# Patient Record
Sex: FEMALE | Race: WHITE | Employment: OTHER | URBAN - METROPOLITAN AREA
[De-identification: names, ages, dates, MRNs, and addresses within clinical notes are randomized per-mention and may not be internally consistent; named-entity substitution may affect disease eponyms.]

---

## 2017-12-11 ENCOUNTER — TELEPHONE (OUTPATIENT)
Dept: INTERNAL MEDICINE | Age: 82
End: 2017-12-11

## 2017-12-11 NOTE — TELEPHONE ENCOUNTER
Patient's son Noa Whyte is asking for cintia to call him because his mother is having swelling in both legs I explain to him that cintia may not be able to give any information since patient hasn't been seen in office   Lazarus Thomas still want's cintia to call him

## 2017-12-12 NOTE — TELEPHONE ENCOUNTER
Call returned to [place]. [facility] is wanting to do physical therapy because the patient's legs are very swollen, which they seem to be all the time. Do you need to see the patient first , I could move her appointment to next Tuesday , or can you just give an order without seeing her.

## 2017-12-13 ENCOUNTER — TELEPHONE (OUTPATIENT)
Dept: INTERNAL MEDICINE | Age: 82
End: 2017-12-13

## 2017-12-13 DIAGNOSIS — R29.898 MUSCULAR DECONDITIONING: Primary | ICD-10-CM

## 2017-12-13 DIAGNOSIS — M40.00 POSTURAL KYPHOSIS, UNSPECIFIED SPINAL REGION: ICD-10-CM

## 2017-12-19 ENCOUNTER — OFFICE VISIT (OUTPATIENT)
Dept: INTERNAL MEDICINE | Age: 82
End: 2017-12-19

## 2017-12-19 VITALS
BODY MASS INDEX: 20.81 KG/M2 | HEIGHT: 60 IN | SYSTOLIC BLOOD PRESSURE: 130 MMHG | WEIGHT: 106 LBS | DIASTOLIC BLOOD PRESSURE: 82 MMHG

## 2017-12-19 DIAGNOSIS — L08.9 INFECTED ABRASION OF RIGHT LOWER EXTREMITY, INITIAL ENCOUNTER: Primary | ICD-10-CM

## 2017-12-19 DIAGNOSIS — M81.8 OTHER OSTEOPOROSIS WITHOUT CURRENT PATHOLOGICAL FRACTURE: ICD-10-CM

## 2017-12-19 DIAGNOSIS — R60.0 LOCALIZED EDEMA: ICD-10-CM

## 2017-12-19 DIAGNOSIS — S80.811A INFECTED ABRASION OF RIGHT LOWER EXTREMITY, INITIAL ENCOUNTER: Primary | ICD-10-CM

## 2017-12-19 DIAGNOSIS — Z85.118 H/O: LUNG CANCER: ICD-10-CM

## 2017-12-19 DIAGNOSIS — R29.6 RECURRENT FALLS WHILE WALKING: ICD-10-CM

## 2017-12-19 DIAGNOSIS — J43.8 OTHER EMPHYSEMA (HCC): ICD-10-CM

## 2017-12-19 PROCEDURE — G8926 SPIRO NO PERF OR DOC: HCPCS | Performed by: INTERNAL MEDICINE

## 2017-12-19 PROCEDURE — 1090F PRES/ABSN URINE INCON ASSESS: CPT | Performed by: INTERNAL MEDICINE

## 2017-12-19 PROCEDURE — 4005F PHARM THX FOR OP RXD: CPT | Performed by: INTERNAL MEDICINE

## 2017-12-19 PROCEDURE — 1123F ACP DISCUSS/DSCN MKR DOCD: CPT | Performed by: INTERNAL MEDICINE

## 2017-12-19 PROCEDURE — 1036F TOBACCO NON-USER: CPT | Performed by: INTERNAL MEDICINE

## 2017-12-19 PROCEDURE — 99203 OFFICE O/P NEW LOW 30 MIN: CPT | Performed by: INTERNAL MEDICINE

## 2017-12-19 PROCEDURE — 3023F SPIROM DOC REV: CPT | Performed by: INTERNAL MEDICINE

## 2017-12-19 PROCEDURE — 4040F PNEUMOC VAC/ADMIN/RCVD: CPT | Performed by: INTERNAL MEDICINE

## 2017-12-19 PROCEDURE — G8420 CALC BMI NORM PARAMETERS: HCPCS | Performed by: INTERNAL MEDICINE

## 2017-12-19 PROCEDURE — G8427 DOCREV CUR MEDS BY ELIG CLIN: HCPCS | Performed by: INTERNAL MEDICINE

## 2017-12-19 PROCEDURE — G8484 FLU IMMUNIZE NO ADMIN: HCPCS | Performed by: INTERNAL MEDICINE

## 2017-12-19 RX ORDER — VITAMIN B COMPLEX
1 CAPSULE ORAL DAILY
COMMUNITY
End: 2018-01-01

## 2017-12-19 RX ORDER — ALBUTEROL SULFATE 0.63 MG/3ML
1 SOLUTION RESPIRATORY (INHALATION) EVERY 6 HOURS PRN
COMMUNITY
End: 2018-02-19 | Stop reason: CLARIF

## 2017-12-27 ENCOUNTER — OFFICE VISIT (OUTPATIENT)
Dept: INTERNAL MEDICINE | Age: 82
End: 2017-12-27

## 2017-12-27 VITALS
DIASTOLIC BLOOD PRESSURE: 80 MMHG | SYSTOLIC BLOOD PRESSURE: 128 MMHG | BODY MASS INDEX: 20.81 KG/M2 | WEIGHT: 106 LBS | HEIGHT: 60 IN

## 2017-12-27 DIAGNOSIS — S80.811D ABRASION OF RIGHT LOWER EXTREMITY, SUBSEQUENT ENCOUNTER: Primary | ICD-10-CM

## 2017-12-27 PROCEDURE — 1036F TOBACCO NON-USER: CPT | Performed by: INTERNAL MEDICINE

## 2017-12-27 PROCEDURE — G8427 DOCREV CUR MEDS BY ELIG CLIN: HCPCS | Performed by: INTERNAL MEDICINE

## 2017-12-27 PROCEDURE — G8484 FLU IMMUNIZE NO ADMIN: HCPCS | Performed by: INTERNAL MEDICINE

## 2017-12-27 PROCEDURE — 1123F ACP DISCUSS/DSCN MKR DOCD: CPT | Performed by: INTERNAL MEDICINE

## 2017-12-27 PROCEDURE — 1090F PRES/ABSN URINE INCON ASSESS: CPT | Performed by: INTERNAL MEDICINE

## 2017-12-27 PROCEDURE — 4040F PNEUMOC VAC/ADMIN/RCVD: CPT | Performed by: INTERNAL MEDICINE

## 2017-12-27 PROCEDURE — G8420 CALC BMI NORM PARAMETERS: HCPCS | Performed by: INTERNAL MEDICINE

## 2017-12-27 PROCEDURE — 99213 OFFICE O/P EST LOW 20 MIN: CPT | Performed by: INTERNAL MEDICINE

## 2017-12-27 NOTE — PROGRESS NOTES
HPI: Patient presented with aloe up of her leg lesion is much better now she is getting a wrapped once a day    Review of Systems: Otherwise negative    Vital Signs: /80   Ht 5' (1.524 m)   Wt 106 lb (48.1 kg)   BMI 20.70 kg/m²   General: Patient appears  non-toxic  HENT: Atraumatic, normocephalic, oral mucosa moist  Lungs:  Clear bilaterally  Heart: Regular rate and rhythm  Abdomen: Non-distended, soft, non-tender  Extremities: Notable edema and erythema and no significant exudate or infection  Neuro: Nonfocal    Medical Decision Making and Plan:  Pertinent Labs & Imaging studies reviewed. (See chart for details)      There are no diagnoses linked to this encounter.

## 2018-01-01 ENCOUNTER — TELEPHONE (OUTPATIENT)
Dept: PULMONOLOGY | Age: 83
End: 2018-01-01

## 2018-01-01 ENCOUNTER — HOSPITAL ENCOUNTER (OUTPATIENT)
Dept: VASCULAR LAB | Age: 83
Discharge: HOME | End: 2018-11-20
Payer: MEDICARE

## 2018-01-01 ENCOUNTER — APPOINTMENT (OUTPATIENT)
Dept: CT IMAGING | Age: 83
End: 2018-01-01
Payer: MEDICARE

## 2018-01-01 ENCOUNTER — TELEPHONE (OUTPATIENT)
Dept: INTERNAL MEDICINE CLINIC | Age: 83
End: 2018-01-01

## 2018-01-01 ENCOUNTER — TELEPHONE (OUTPATIENT)
Dept: INTERNAL MEDICINE | Age: 83
End: 2018-01-01

## 2018-01-01 ENCOUNTER — HOSPITAL ENCOUNTER (EMERGENCY)
Age: 83
Discharge: HOME | End: 2018-12-26
Attending: EMERGENCY MEDICINE
Payer: MEDICARE

## 2018-01-01 ENCOUNTER — APPOINTMENT (OUTPATIENT)
Dept: MRI IMAGING | Age: 83
End: 2018-01-01
Payer: MEDICARE

## 2018-01-01 ENCOUNTER — OFFICE VISIT (OUTPATIENT)
Dept: PULMONOLOGY | Age: 83
End: 2018-01-01
Payer: MEDICARE

## 2018-01-01 ENCOUNTER — OFFICE VISIT (OUTPATIENT)
Dept: INTERNAL MEDICINE CLINIC | Age: 83
End: 2018-01-01
Payer: MEDICARE

## 2018-01-01 ENCOUNTER — TELEPHONE (OUTPATIENT)
Dept: DERMATOLOGY | Age: 83
End: 2018-01-01

## 2018-01-01 VITALS
WEIGHT: 105 LBS | RESPIRATION RATE: 16 BRPM | OXYGEN SATURATION: 94 % | SYSTOLIC BLOOD PRESSURE: 122 MMHG | HEIGHT: 60 IN | HEART RATE: 105 BPM | BODY MASS INDEX: 20.62 KG/M2 | DIASTOLIC BLOOD PRESSURE: 68 MMHG

## 2018-01-01 VITALS
DIASTOLIC BLOOD PRESSURE: 82 MMHG | SYSTOLIC BLOOD PRESSURE: 162 MMHG | BODY MASS INDEX: 20.2 KG/M2 | OXYGEN SATURATION: 98 % | WEIGHT: 107 LBS | HEIGHT: 61 IN | HEART RATE: 77 BPM

## 2018-01-01 VITALS
RESPIRATION RATE: 12 BRPM | OXYGEN SATURATION: 94 % | HEIGHT: 60 IN | WEIGHT: 103.2 LBS | BODY MASS INDEX: 20.26 KG/M2 | HEART RATE: 101 BPM | TEMPERATURE: 97.8 F | SYSTOLIC BLOOD PRESSURE: 176 MMHG | DIASTOLIC BLOOD PRESSURE: 89 MMHG

## 2018-01-01 DIAGNOSIS — M79.89 LEFT LEG SWELLING: ICD-10-CM

## 2018-01-01 DIAGNOSIS — R52 GENERALIZED PAIN: ICD-10-CM

## 2018-01-01 DIAGNOSIS — L03.116 CELLULITIS OF LEFT LEG: Primary | ICD-10-CM

## 2018-01-01 DIAGNOSIS — R06.09 DOE (DYSPNEA ON EXERTION): Primary | ICD-10-CM

## 2018-01-01 DIAGNOSIS — Z85.118 HISTORY OF LUNG CANCER: ICD-10-CM

## 2018-01-01 DIAGNOSIS — Z90.2 S/P LOBECTOMY OF LUNG: ICD-10-CM

## 2018-01-01 DIAGNOSIS — J44.9 CHRONIC OBSTRUCTIVE PULMONARY DISEASE, UNSPECIFIED COPD TYPE (HCC): Primary | ICD-10-CM

## 2018-01-01 DIAGNOSIS — M54.2 NECK PAIN: Primary | ICD-10-CM

## 2018-01-01 DIAGNOSIS — R06.02 SHORTNESS OF BREATH: ICD-10-CM

## 2018-01-01 DIAGNOSIS — G47.00 INSOMNIA, UNSPECIFIED TYPE: Primary | ICD-10-CM

## 2018-01-01 DIAGNOSIS — S09.90XA MINOR HEAD INJURY, INITIAL ENCOUNTER: ICD-10-CM

## 2018-01-01 DIAGNOSIS — M40.03 POSTURAL KYPHOSIS OF CERVICOTHORACIC REGION: ICD-10-CM

## 2018-01-01 DIAGNOSIS — M79.89 LEFT LEG SWELLING: Primary | ICD-10-CM

## 2018-01-01 DIAGNOSIS — J47.1 BRONCHIECTASIS WITH ACUTE EXACERBATION (HCC): Primary | ICD-10-CM

## 2018-01-01 LAB
AMORPHOUS: ABNORMAL /HPF
BACTERIA: ABNORMAL /HPF
BILIRUBIN URINE: NEGATIVE MG/DL
BLOOD, URINE: NEGATIVE
CASTS: ABNORMAL /LPF
CLARITY: ABNORMAL
COLOR: ABNORMAL
GLUCOSE URINE: NEGATIVE MG/DL
KETONES, URINE: NEGATIVE MG/DL
LEUKOCYTE ESTERASE, URINE: NEGATIVE
MICROSCOPIC EXAMINATION: YES
NITRITE, URINE: NEGATIVE
PH UA: 7.5
PROTEIN UA: 30 MG/DL
RBC UA: ABNORMAL /HPF (ref 0–2)
SPECIFIC GRAVITY UA: 1.02
UROBILINOGEN, URINE: 0.2 E.U./DL
WBC UA: ABNORMAL /HPF (ref 0–5)

## 2018-01-01 PROCEDURE — G8420 CALC BMI NORM PARAMETERS: HCPCS | Performed by: INTERNAL MEDICINE

## 2018-01-01 PROCEDURE — 99213 OFFICE O/P EST LOW 20 MIN: CPT | Performed by: INTERNAL MEDICINE

## 2018-01-01 PROCEDURE — 70450 CT HEAD/BRAIN W/O DYE: CPT

## 2018-01-01 PROCEDURE — 1123F ACP DISCUSS/DSCN MKR DOCD: CPT | Performed by: INTERNAL MEDICINE

## 2018-01-01 PROCEDURE — 72141 MRI NECK SPINE W/O DYE: CPT

## 2018-01-01 PROCEDURE — 1090F PRES/ABSN URINE INCON ASSESS: CPT | Performed by: INTERNAL MEDICINE

## 2018-01-01 PROCEDURE — 81001 URINALYSIS AUTO W/SCOPE: CPT

## 2018-01-01 PROCEDURE — 72125 CT NECK SPINE W/O DYE: CPT

## 2018-01-01 PROCEDURE — G8482 FLU IMMUNIZE ORDER/ADMIN: HCPCS | Performed by: INTERNAL MEDICINE

## 2018-01-01 PROCEDURE — 99284 EMERGENCY DEPT VISIT MOD MDM: CPT

## 2018-01-01 PROCEDURE — 1101F PT FALLS ASSESS-DOCD LE1/YR: CPT | Performed by: INTERNAL MEDICINE

## 2018-01-01 PROCEDURE — 4040F PNEUMOC VAC/ADMIN/RCVD: CPT | Performed by: INTERNAL MEDICINE

## 2018-01-01 PROCEDURE — 93971 EXTREMITY STUDY: CPT

## 2018-01-01 PROCEDURE — 99214 OFFICE O/P EST MOD 30 MIN: CPT | Performed by: INTERNAL MEDICINE

## 2018-01-01 PROCEDURE — G8427 DOCREV CUR MEDS BY ELIG CLIN: HCPCS | Performed by: INTERNAL MEDICINE

## 2018-01-01 PROCEDURE — 1036F TOBACCO NON-USER: CPT | Performed by: INTERNAL MEDICINE

## 2018-01-01 RX ORDER — ALPRAZOLAM 0.5 MG/1
0.5 TABLET ORAL NIGHTLY PRN
Qty: 30 TABLET | Refills: 0 | OUTPATIENT
Start: 2018-01-01 | End: 2018-01-01

## 2018-01-01 RX ORDER — ACETAMINOPHEN 500 MG
500 TABLET ORAL 4 TIMES DAILY PRN
Qty: 360 TABLET | Refills: 1 | Status: SHIPPED | OUTPATIENT
Start: 2018-01-01 | End: 2019-01-01 | Stop reason: CLARIF

## 2018-01-01 RX ORDER — DOXYCYCLINE HYCLATE 100 MG
100 TABLET ORAL 2 TIMES DAILY
Qty: 20 TABLET | Refills: 0 | Status: SHIPPED | OUTPATIENT
Start: 2018-01-01 | End: 2018-01-01 | Stop reason: SDUPTHER

## 2018-01-01 RX ORDER — DOXYCYCLINE HYCLATE 100 MG
100 TABLET ORAL 2 TIMES DAILY
Qty: 14 TABLET | Refills: 0 | Status: SHIPPED | OUTPATIENT
Start: 2018-01-01 | End: 2018-01-01

## 2018-01-01 RX ORDER — CEPHALEXIN 500 MG/1
500 CAPSULE ORAL 4 TIMES DAILY
Qty: 28 CAPSULE | Refills: 0 | Status: SHIPPED | OUTPATIENT
Start: 2018-01-01 | End: 2018-01-01

## 2018-01-01 RX ORDER — GUAIFENESIN 600 MG/1
600 TABLET, EXTENDED RELEASE ORAL 2 TIMES DAILY
Qty: 30 TABLET | Refills: 0 | Status: SHIPPED | OUTPATIENT
Start: 2018-01-01 | End: 2019-01-01 | Stop reason: CLARIF

## 2018-01-01 ASSESSMENT — PAIN DESCRIPTION - LOCATION: LOCATION: NECK

## 2018-01-01 ASSESSMENT — PATIENT HEALTH QUESTIONNAIRE - PHQ9
SUM OF ALL RESPONSES TO PHQ9 QUESTIONS 1 & 2: 0
1. LITTLE INTEREST OR PLEASURE IN DOING THINGS: 0
SUM OF ALL RESPONSES TO PHQ QUESTIONS 1-9: 0
SUM OF ALL RESPONSES TO PHQ QUESTIONS 1-9: 0
2. FEELING DOWN, DEPRESSED OR HOPELESS: 0

## 2018-01-01 ASSESSMENT — PAIN DESCRIPTION - DESCRIPTORS: DESCRIPTORS: CONSTANT;ACHING

## 2018-01-01 ASSESSMENT — PAIN DESCRIPTION - PAIN TYPE: TYPE: ACUTE PAIN

## 2018-01-01 ASSESSMENT — PAIN DESCRIPTION - FREQUENCY: FREQUENCY: CONTINUOUS

## 2018-01-01 ASSESSMENT — PAIN DESCRIPTION - ORIENTATION: ORIENTATION: MID

## 2018-01-01 ASSESSMENT — ENCOUNTER SYMPTOMS
RESPIRATORY NEGATIVE: 1
GASTROINTESTINAL NEGATIVE: 1
ABDOMINAL PAIN: 0
BACK PAIN: 0

## 2018-01-01 ASSESSMENT — PAIN SCALES - GENERAL: PAINLEVEL_OUTOF10: 6

## 2018-01-15 ENCOUNTER — TELEPHONE (OUTPATIENT)
Dept: INTERNAL MEDICINE | Age: 83
End: 2018-01-15

## 2018-01-15 NOTE — TELEPHONE ENCOUNTER
Alireza Min from [facility] care stated that pt  has  elevated heart rate at rest 111.   Please advise

## 2018-01-24 ENCOUNTER — TELEPHONE (OUTPATIENT)
Dept: INTERNAL MEDICINE | Age: 83
End: 2018-01-24

## 2018-01-24 DIAGNOSIS — R19.7 DIARRHEA, UNSPECIFIED TYPE: Primary | ICD-10-CM

## 2018-01-24 NOTE — TELEPHONE ENCOUNTER
Monica Diehl Nurse calling from [facility] and can be reached at [phone]  Diarrhea    How long? Yes x 2 days   Nausea? No  Blood in stool? No  Fever?   No   Tried OTC 2 doses of Imodium AD pt feels a little   Pharmacy:listed     Request to be Tx via phone   Aware that Cate Palma MD arrives around 1:20 pm today meanwhile message will be sent

## 2018-01-26 ENCOUNTER — TELEPHONE (OUTPATIENT)
Dept: INTERNAL MEDICINE | Age: 83
End: 2018-01-26

## 2018-01-26 NOTE — TELEPHONE ENCOUNTER
Radha Gonzales From [facility] stated that pt stool is formed. Does pt still needs stool sample for C-Diff?   Please advise

## 2018-01-26 NOTE — TELEPHONE ENCOUNTER
No , if stool is formed we do not need to run cdiff test.    Call returned and spoke to [place].

## 2018-01-26 NOTE — TELEPHONE ENCOUNTER
[facility] called to let Dr. Eusebio Moore know that they did not meet their frequency this week since the patient was sick. They saw the doctor 1 time instead of 2. Please call with questions.

## 2018-02-02 ENCOUNTER — TELEPHONE (OUTPATIENT)
Dept: INTERNAL MEDICINE | Age: 83
End: 2018-02-02

## 2018-02-09 ENCOUNTER — TELEPHONE (OUTPATIENT)
Dept: INTERNAL MEDICINE | Age: 83
End: 2018-02-09

## 2018-02-19 PROBLEM — R06.02 SHORTNESS OF BREATH: Status: ACTIVE | Noted: 2018-02-19

## 2018-02-21 ENCOUNTER — TELEPHONE (OUTPATIENT)
Dept: INFECTIOUS DISEASES | Age: 83
End: 2018-02-21

## 2018-02-21 PROBLEM — J47.1 BRONCHIECTASIS WITH ACUTE EXACERBATION (HCC): Status: ACTIVE | Noted: 2018-02-21

## 2018-02-21 PROBLEM — J96.01 ACUTE RESPIRATORY FAILURE WITH HYPOXIA (HCC): Status: ACTIVE | Noted: 2018-02-21

## 2018-02-21 PROBLEM — I16.0 HYPERTENSIVE URGENCY: Status: ACTIVE | Noted: 2018-02-21

## 2018-02-21 PROBLEM — B33.8 RSV (RESPIRATORY SYNCYTIAL VIRUS INFECTION): Status: ACTIVE | Noted: 2018-02-21

## 2018-02-22 ENCOUNTER — TELEPHONE (OUTPATIENT)
Dept: INTERNAL MEDICINE | Age: 83
End: 2018-02-22

## 2018-02-26 ENCOUNTER — TELEPHONE (OUTPATIENT)
Dept: INTERNAL MEDICINE | Age: 83
End: 2018-02-26

## 2018-02-26 NOTE — TELEPHONE ENCOUNTER
Call returned to Junior Buckley at number listed [phone] to get more information. The mailbox is full and unable to leave messages. Will wait and call back at a later time.

## 2018-03-20 ENCOUNTER — TELEPHONE (OUTPATIENT)
Dept: INTERNAL MEDICINE | Age: 83
End: 2018-03-20

## 2018-04-09 ENCOUNTER — OFFICE VISIT (OUTPATIENT)
Dept: INTERNAL MEDICINE | Age: 83
End: 2018-04-09

## 2018-04-09 VITALS
BODY MASS INDEX: 19.45 KG/M2 | HEART RATE: 91 BPM | OXYGEN SATURATION: 95 % | WEIGHT: 103 LBS | HEIGHT: 61 IN | DIASTOLIC BLOOD PRESSURE: 74 MMHG | SYSTOLIC BLOOD PRESSURE: 120 MMHG

## 2018-04-09 DIAGNOSIS — I10 ESSENTIAL HYPERTENSION: ICD-10-CM

## 2018-04-09 DIAGNOSIS — R60.0 BILATERAL LOWER EXTREMITY EDEMA: ICD-10-CM

## 2018-04-09 DIAGNOSIS — J43.8 OTHER EMPHYSEMA (HCC): Primary | ICD-10-CM

## 2018-04-09 PROCEDURE — 1123F ACP DISCUSS/DSCN MKR DOCD: CPT | Performed by: HOSPITALIST

## 2018-04-09 PROCEDURE — 4040F PNEUMOC VAC/ADMIN/RCVD: CPT | Performed by: HOSPITALIST

## 2018-04-09 PROCEDURE — 3023F SPIROM DOC REV: CPT | Performed by: HOSPITALIST

## 2018-04-09 PROCEDURE — G8427 DOCREV CUR MEDS BY ELIG CLIN: HCPCS | Performed by: HOSPITALIST

## 2018-04-09 PROCEDURE — G8926 SPIRO NO PERF OR DOC: HCPCS | Performed by: HOSPITALIST

## 2018-04-09 PROCEDURE — 1090F PRES/ABSN URINE INCON ASSESS: CPT | Performed by: HOSPITALIST

## 2018-04-09 PROCEDURE — 1036F TOBACCO NON-USER: CPT | Performed by: HOSPITALIST

## 2018-04-09 PROCEDURE — 99214 OFFICE O/P EST MOD 30 MIN: CPT | Performed by: HOSPITALIST

## 2018-04-09 PROCEDURE — G8420 CALC BMI NORM PARAMETERS: HCPCS | Performed by: HOSPITALIST

## 2018-04-09 ASSESSMENT — PATIENT HEALTH QUESTIONNAIRE - PHQ9
2. FEELING DOWN, DEPRESSED OR HOPELESS: 0
SUM OF ALL RESPONSES TO PHQ9 QUESTIONS 1 & 2: 0
SUM OF ALL RESPONSES TO PHQ QUESTIONS 1-9: 0
1. LITTLE INTEREST OR PLEASURE IN DOING THINGS: 0

## 2018-04-09 ASSESSMENT — ENCOUNTER SYMPTOMS
EYES NEGATIVE: 1
COUGH: 1
GASTROINTESTINAL NEGATIVE: 1

## 2018-04-23 ENCOUNTER — TELEPHONE (OUTPATIENT)
Dept: INTERNAL MEDICINE | Age: 83
End: 2018-04-23

## 2018-05-04 ENCOUNTER — TELEPHONE (OUTPATIENT)
Dept: INTERNAL MEDICINE | Age: 83
End: 2018-05-04

## 2018-05-04 DIAGNOSIS — Z85.118 H/O: LUNG CANCER: ICD-10-CM

## 2018-05-04 DIAGNOSIS — J43.8 OTHER EMPHYSEMA (HCC): ICD-10-CM

## 2018-05-04 DIAGNOSIS — J47.1 BRONCHIECTASIS WITH ACUTE EXACERBATION (HCC): Primary | ICD-10-CM

## 2018-05-04 DIAGNOSIS — R06.02 SHORTNESS OF BREATH: ICD-10-CM

## 2018-05-10 ENCOUNTER — OFFICE VISIT (OUTPATIENT)
Dept: PULMONOLOGY | Age: 83
End: 2018-05-10

## 2018-05-10 VITALS
SYSTOLIC BLOOD PRESSURE: 130 MMHG | RESPIRATION RATE: 20 BRPM | HEART RATE: 119 BPM | OXYGEN SATURATION: 92 % | HEIGHT: 61 IN | BODY MASS INDEX: 18.88 KG/M2 | DIASTOLIC BLOOD PRESSURE: 82 MMHG | WEIGHT: 100 LBS

## 2018-05-10 DIAGNOSIS — M40.03 POSTURAL KYPHOSIS OF CERVICOTHORACIC REGION: ICD-10-CM

## 2018-05-10 DIAGNOSIS — Z90.2 S/P LOBECTOMY OF LUNG: ICD-10-CM

## 2018-05-10 DIAGNOSIS — Z85.118 HISTORY OF LUNG CANCER: ICD-10-CM

## 2018-05-10 DIAGNOSIS — R06.09 DOE (DYSPNEA ON EXERTION): Primary | ICD-10-CM

## 2018-05-10 PROCEDURE — 4040F PNEUMOC VAC/ADMIN/RCVD: CPT | Performed by: INTERNAL MEDICINE

## 2018-05-10 PROCEDURE — G8420 CALC BMI NORM PARAMETERS: HCPCS | Performed by: INTERNAL MEDICINE

## 2018-05-10 PROCEDURE — G8427 DOCREV CUR MEDS BY ELIG CLIN: HCPCS | Performed by: INTERNAL MEDICINE

## 2018-05-10 PROCEDURE — 1090F PRES/ABSN URINE INCON ASSESS: CPT | Performed by: INTERNAL MEDICINE

## 2018-05-10 PROCEDURE — 99204 OFFICE O/P NEW MOD 45 MIN: CPT | Performed by: INTERNAL MEDICINE

## 2018-05-10 PROCEDURE — 1123F ACP DISCUSS/DSCN MKR DOCD: CPT | Performed by: INTERNAL MEDICINE

## 2018-05-10 PROCEDURE — 1036F TOBACCO NON-USER: CPT | Performed by: INTERNAL MEDICINE

## 2018-05-10 RX ORDER — ALBUTEROL SULFATE 2.5 MG/3ML
2.5 SOLUTION RESPIRATORY (INHALATION) EVERY 6 HOURS PRN
Qty: 120 EACH | Refills: 11 | Status: SHIPPED | OUTPATIENT
Start: 2018-05-10 | End: 2019-01-01 | Stop reason: SDUPTHER

## 2018-05-10 RX ORDER — BUDESONIDE 0.5 MG/2ML
1 INHALANT ORAL 2 TIMES DAILY
Qty: 60 AMPULE | Refills: 11 | Status: SHIPPED | OUTPATIENT
Start: 2018-05-10 | End: 2019-01-01 | Stop reason: SDUPTHER

## 2018-05-30 ENCOUNTER — TELEPHONE (OUTPATIENT)
Dept: INTERNAL MEDICINE | Age: 83
End: 2018-05-30

## 2018-06-14 ENCOUNTER — TELEPHONE (OUTPATIENT)
Dept: INTERNAL MEDICINE | Age: 83
End: 2018-06-14

## 2018-06-15 ENCOUNTER — OFFICE VISIT (OUTPATIENT)
Dept: DERMATOLOGY | Age: 83
End: 2018-06-15

## 2018-06-15 DIAGNOSIS — A46 ERYSIPELAS: Primary | ICD-10-CM

## 2018-06-15 PROCEDURE — 4040F PNEUMOC VAC/ADMIN/RCVD: CPT | Performed by: DERMATOLOGY

## 2018-06-15 PROCEDURE — G8427 DOCREV CUR MEDS BY ELIG CLIN: HCPCS | Performed by: DERMATOLOGY

## 2018-06-15 PROCEDURE — G8420 CALC BMI NORM PARAMETERS: HCPCS | Performed by: DERMATOLOGY

## 2018-06-15 PROCEDURE — 1123F ACP DISCUSS/DSCN MKR DOCD: CPT | Performed by: DERMATOLOGY

## 2018-06-15 PROCEDURE — 1036F TOBACCO NON-USER: CPT | Performed by: DERMATOLOGY

## 2018-06-15 PROCEDURE — 1090F PRES/ABSN URINE INCON ASSESS: CPT | Performed by: DERMATOLOGY

## 2018-06-15 PROCEDURE — 99202 OFFICE O/P NEW SF 15 MIN: CPT | Performed by: DERMATOLOGY

## 2018-06-15 RX ORDER — CEPHALEXIN 500 MG/1
500 CAPSULE ORAL 3 TIMES DAILY
Qty: 21 CAPSULE | Refills: 0 | Status: SHIPPED | OUTPATIENT
Start: 2018-06-15 | End: 2018-06-22

## 2018-06-18 ENCOUNTER — TELEPHONE (OUTPATIENT)
Dept: DERMATOLOGY | Age: 83
End: 2018-06-18

## 2018-06-22 ENCOUNTER — TELEPHONE (OUTPATIENT)
Dept: DERMATOLOGY | Age: 83
End: 2018-06-22

## 2018-06-27 ENCOUNTER — TELEPHONE (OUTPATIENT)
Dept: DERMATOLOGY | Age: 83
End: 2018-06-27

## 2018-07-03 ENCOUNTER — OFFICE VISIT (OUTPATIENT)
Dept: DERMATOLOGY | Age: 83
End: 2018-07-03

## 2018-07-03 DIAGNOSIS — Z85.828 HISTORY OF NONMELANOMA SKIN CANCER: ICD-10-CM

## 2018-07-03 DIAGNOSIS — L82.1 SEBORRHEIC KERATOSIS: Primary | ICD-10-CM

## 2018-07-03 DIAGNOSIS — D22.9 MULTIPLE NEVI: ICD-10-CM

## 2018-07-03 DIAGNOSIS — L71.9 ROSACEA: ICD-10-CM

## 2018-07-03 DIAGNOSIS — L30.9 DERMATITIS: ICD-10-CM

## 2018-07-03 DIAGNOSIS — L57.0 AK (ACTINIC KERATOSIS): ICD-10-CM

## 2018-07-03 PROCEDURE — 17000 DESTRUCT PREMALG LESION: CPT | Performed by: DERMATOLOGY

## 2018-07-03 PROCEDURE — G8420 CALC BMI NORM PARAMETERS: HCPCS | Performed by: DERMATOLOGY

## 2018-07-03 PROCEDURE — 1123F ACP DISCUSS/DSCN MKR DOCD: CPT | Performed by: DERMATOLOGY

## 2018-07-03 PROCEDURE — 1090F PRES/ABSN URINE INCON ASSESS: CPT | Performed by: DERMATOLOGY

## 2018-07-03 PROCEDURE — 1036F TOBACCO NON-USER: CPT | Performed by: DERMATOLOGY

## 2018-07-03 PROCEDURE — G8427 DOCREV CUR MEDS BY ELIG CLIN: HCPCS | Performed by: DERMATOLOGY

## 2018-07-03 PROCEDURE — 99214 OFFICE O/P EST MOD 30 MIN: CPT | Performed by: DERMATOLOGY

## 2018-07-03 PROCEDURE — 4040F PNEUMOC VAC/ADMIN/RCVD: CPT | Performed by: DERMATOLOGY

## 2018-07-03 PROCEDURE — 1101F PT FALLS ASSESS-DOCD LE1/YR: CPT | Performed by: DERMATOLOGY

## 2018-07-03 NOTE — PROGRESS NOTES
[facility]  James Douglas MD  [phone]      Lorena Earing  2/25/1927    80 y.o. female     Date of Visit: 7/3/2018    Last seen: a few weeks ago by Dr. Estevan Holloway    Chief Complaint: f/u skin cancer, lesions  Chief Complaint   Patient presents with    Skin Lesion     NP, FSE, hx of BCC former dermatologist Jasper Camargo      History of Present Illness:  *previous dermatologist - Dr. Pito Doran in [place], [place]    1. Here for evaluation of multiple asx pigmented lesions on the trunk and extremities, present for many years; no change in size/shape/color of any lesions; no bleeding lesions. 2. She has a hx of NMSC - BCC (doesn't recall location). No probs or new concerns noted. 3. She has rough lesion on the L lateral brow. Asx.     4. She has rosacea/facial erythema with ? Hx of overlap dermatitis. Seen a few weeks ago by Dr. Estevan Holloway for presumed erysipelas and improved with Keflex. Describes sensitive/reactive skin. Reviewed previous records - Has tried many things in the past including the following (bold used most recently) with variable improvement. Mildly erythematous today. Overall asx currently. mirvaso  Metrocream/noritate  Desonide  elidel  soolantra  eucrisa  pandel (hydrocortisone)  doxy  IM [place] for ? Overlap dermatitis    5. Hx of dermatitis - legs. No probs currently. Review of Systems:  Gen: Feels well, good sense of health. Skin: No changing moles or lesions. No new rashes. Past Medical History, Family History, Surgical History, Medications and Allergies reviewed. Outpatient Prescriptions Marked as Taking for the 7/3/18 encounter (Office Visit) with Dory Roman MD   Medication Sig Dispense Refill    hydrocortisone 2.5 % cream Apply topically 2 times daily.  30 g 0    budesonide (PULMICORT) 0.5 MG/2ML nebulizer suspension Take 2 mLs by nebulization 2 times daily 60 ampule 11    albuterol (PROVENTIL) (2.5 MG/3ML) 0.083% nebulizer

## 2018-07-03 NOTE — PATIENT INSTRUCTIONS
bleeding into the blister, forming a blood blister. This is nothing to be alarmed about.  If the blister is tense, uncomfortable, or much larger than the site that was frozen, you may pop the blister along its edge with a sterile needle (boiled, heated under a flame, or cleaned with alcohol) to allow the fluid to drain out. If the blister does not bother you, no treatment is needed.  Do NOT peel off the top of the blister roof. It will act as a dressing on top of your wound. WOUND CARE:    You may shower or bathe as usual, but avoid scrubbing the areas that have been frozen.  Cleanse the site twice a day with mild soapy water, and then apply a thin film of white petrolatum (Vaseline©).  You do not need to cover the area, but can if you prefer.  Do NOT allow the site to become dry or crusted, or attempt to dry it out with rubbing alcohol or hydrogen peroxide.  Continue this regimen until the area is pink and healed. Depending on the size and location of your cryosurgery site, healing may take 2 to 4 weeks.  The area may continue to be pink for several weeks, and over the next few months may become darker or lighter than the surrounding skin. This may be a permanent change.

## 2018-09-14 NOTE — TELEPHONE ENCOUNTER
Patients left ankle is swollen, red ,warm to the touch. Denies pain. Concerned could be cellulitis. Patients son is a friend of Dr. Stefany Jenkins.   If Dr. Stefany Jenkins calls an antibiotic in please call into   [facility] Gaby Tolbert

## 2018-09-14 NOTE — TELEPHONE ENCOUNTER
Spoke to pt son (on hippa) informed script being sent in and MD advise   Verified pharmacy   Script sent

## 2018-12-19 NOTE — TELEPHONE ENCOUNTER
Pt son called again regarding rx for nebulizer. He stated his mom is in desperate need of this nebulizer and hopes to pick this script up today. I explained to pt son that Dr. Joyce Abraham is in the hospital seeing pts and will make it over to sign the order today. I also explained that Dr. Joyce Abraham pts canceled yesterday so he stayed at the hospital and continued caring for the pts over there. Le Carlson was under the impression that Dr. Joyce Abraham had pts in the office yesterday and would be over to sign the order. He expressed a clear understanding after things were explained and will be in later for the rx.

## 2018-12-21 NOTE — TELEPHONE ENCOUNTER
Faxed orders several times for PT, Eval     Please sign orders that were faxed or send an order for PT, Eval and treat to [phone] attn:  Gladys Key.

## 2018-12-26 NOTE — FLOWSHEET NOTE
12/26/18 1521   Encounter Summary   Services provided to: Patient and family together   Referral/Consult From: Rounding   Continue Visiting (seen 12/26/18, DARVIN. )   Complexity of Encounter Moderate   Length of Encounter 15 minutes   Routine   Type Initial   Assessment Approachable   Intervention Active listening;Nurtured hope   Outcome Expressed gratitude

## 2018-12-26 NOTE — ED PROVIDER NOTES
[address] [facility] ENCOUNTER          PHYSICIAN ASSISTANT NOTE       Date of evaluation: 12/26/2018    Chief Complaint     Fall (Arrived via EMS from [address] home s/p mechinal fall, denies LOC, a&o x's 4, c/o neck pain and denies c-spine tenderness. )      History of Present Illness     Vega Fung is a 80 y.o. female who presents To the emergency department with right-sided neck pain after a fall. The patient states she tripped and fell and her bedroom getting ready to take a shower hitting her head and neck on the dresser. She denies loss of consciousness, presyncopal or syncopal episode that led to this fall. She presents stating she has pain in the right side of her neck. She denies pain in her mid to low back. Denies chest pain, shortness of breath or abdominal pain. Denies blurry or double vision. Denies nausea or vomiting. Denies numbness or tingling of her extremities. Review of Systems     Review of Systems   Constitutional: Negative for chills and fever. HENT: Negative. Respiratory: Negative. Cardiovascular: Negative. Negative for chest pain. Gastrointestinal: Negative. Negative for abdominal pain. Genitourinary: Negative. Musculoskeletal: Positive for neck pain. Negative for back pain. Skin: Negative for rash and wound. Neurological: Negative for dizziness, syncope, weakness, light-headedness and headaches. Psychiatric/Behavioral: Negative. All other systems reviewed and are negative. Past Medical, Surgical, Family, and Social History     She has a past medical history of Basal cell carcinoma; Cancer ([address] Localized edema; and Other emphysema ([facility] [address] She has a past surgical history that includes Appendectomy and Lung removal, partial.  Her family history is not on file. She reports that she has quit smoking. Her smoking use included Cigarettes. She quit after 10.00 years of use.  She has never used smokeless tobacco. She reports that she drinks about 0.6 oz of alcohol per week . She reports that she does not use drugs. Medications     Current Discharge Medication List      CONTINUE these medications which have NOT CHANGED    Details   metroNIDAZOLE (METROCREAM) 0.75 % cream Apply to face BID  Qty: 45 g, Refills: 6      hydrocortisone 2.5 % cream Apply topically 2 times daily. Qty: 30 g, Refills: 0      budesonide (PULMICORT) 0.5 MG/2ML nebulizer suspension Take 2 mLs by nebulization 2 times daily  Qty: 60 ampule, Refills: 11      albuterol (PROVENTIL) (2.5 MG/3ML) 0.083% nebulizer solution Take 3 mLs by nebulization every 6 hours as needed for Wheezing  Qty: 120 each, Refills: 11             Allergies     She is allergic to vancomycin. Physical Exam     INITIAL VITALS: BP: (!) 194/79, Temp: 97.8 °F (36.6 °C), Pulse: 101, Resp: 12, SpO2: 98 %  Physical Exam   Constitutional: She is oriented to person, place, and time. She appears well-developed and well-nourished. HENT:   Head: Normocephalic. Right Ear: External ear normal.   Left Ear: External ear normal.   Nose: Nose normal.   Mouth/Throat: Oropharynx is clear and moist. No oropharyngeal exudate. Examination she has an area of contusion/hematoma along the left scalp without abrasion or laceration. No bleeding. No bony step-offs or deformities. Eyes: Pupils are equal, round, and reactive to light. Conjunctivae and EOM are normal. Right eye exhibits no discharge. Left eye exhibits no discharge. Neck: Normal range of motion. Neck supple. Pulmonary/Chest: Effort normal.   Musculoskeletal: Normal range of motion. Distal pulses 2+ bilaterally of upper and lower extremities. She is moving all extremities without difficulty. She denies tenderness to palpation of the midline cervical, thoracic and lumbar spine.   She does have some tenderness to palpation along the paravertebral musculature in the cervical region on the right side along the sternocleidomastoid

## 2018-12-26 NOTE — ED NOTES
Bed: A12-12  Expected date:   Expected time:   Means of arrival:   Comments: The other guys that didn't call.      Severo Chavez RN  12/26/18 9500

## 2018-12-27 NOTE — TELEPHONE ENCOUNTER
Kaylah Kauffman pt son would like to know if can get a script for mucinex D and Tylenol Extra strength due it being cheaper if a script was written instead of buying it over the counter  [facility] - [place] [phone] -  [phone]

## 2019-01-01 ENCOUNTER — TELEPHONE (OUTPATIENT)
Dept: INTERNAL MEDICINE CLINIC | Age: 84
End: 2019-01-01

## 2019-01-01 ENCOUNTER — APPOINTMENT (OUTPATIENT)
Dept: CT IMAGING | Age: 84
DRG: 193 | End: 2019-01-01
Payer: MEDICARE

## 2019-01-01 ENCOUNTER — HOSPITAL ENCOUNTER (EMERGENCY)
Age: 84
Discharge: HOME | End: 2019-07-09
Attending: EMERGENCY MEDICINE
Payer: MEDICARE

## 2019-01-01 ENCOUNTER — OFFICE VISIT (OUTPATIENT)
Dept: INTERNAL MEDICINE CLINIC | Age: 84
End: 2019-01-01
Payer: MEDICARE

## 2019-01-01 ENCOUNTER — HOSPITAL ENCOUNTER (INPATIENT)
Age: 84
LOS: 5 days | Discharge: NURSING, REHAB OR LONG-TERM CARE | DRG: 193 | End: 2019-03-25
Attending: EMERGENCY MEDICINE | Admitting: INTERNAL MEDICINE
Payer: MEDICARE

## 2019-01-01 ENCOUNTER — TELEPHONE (OUTPATIENT)
Dept: CASE MANAGEMENT | Age: 84
End: 2019-01-01

## 2019-01-01 ENCOUNTER — APPOINTMENT (OUTPATIENT)
Dept: GENERAL RADIOLOGY | Age: 84
DRG: 193 | End: 2019-01-01
Payer: MEDICARE

## 2019-01-01 ENCOUNTER — OFFICE VISIT (OUTPATIENT)
Dept: DERMATOLOGY | Age: 84
End: 2019-01-01
Payer: MEDICARE

## 2019-01-01 ENCOUNTER — HOSPITAL ENCOUNTER (OUTPATIENT)
Dept: CT IMAGING | Age: 84
Discharge: HOME | End: 2019-02-04
Payer: MEDICARE

## 2019-01-01 ENCOUNTER — OFFICE VISIT (OUTPATIENT)
Dept: PULMONOLOGY | Age: 84
End: 2019-01-01
Payer: MEDICARE

## 2019-01-01 ENCOUNTER — APPOINTMENT (OUTPATIENT)
Dept: CT IMAGING | Age: 84
End: 2019-01-01
Payer: MEDICARE

## 2019-01-01 ENCOUNTER — APPOINTMENT (OUTPATIENT)
Dept: GENERAL RADIOLOGY | Age: 84
End: 2019-01-01
Payer: MEDICARE

## 2019-01-01 ENCOUNTER — HOSPITAL ENCOUNTER (INPATIENT)
Age: 84
LOS: 1 days | DRG: 193 | End: 2019-07-12
Attending: EMERGENCY MEDICINE | Admitting: INTERNAL MEDICINE
Payer: MEDICARE

## 2019-01-01 ENCOUNTER — TELEPHONE (OUTPATIENT)
Dept: PULMONOLOGY | Age: 84
End: 2019-01-01

## 2019-01-01 VITALS
TEMPERATURE: 98.1 F | RESPIRATION RATE: 16 BRPM | DIASTOLIC BLOOD PRESSURE: 66 MMHG | SYSTOLIC BLOOD PRESSURE: 143 MMHG | HEART RATE: 91 BPM | OXYGEN SATURATION: 91 %

## 2019-01-01 VITALS
RESPIRATION RATE: 20 BRPM | HEIGHT: 59 IN | BODY MASS INDEX: 22.78 KG/M2 | DIASTOLIC BLOOD PRESSURE: 60 MMHG | TEMPERATURE: 98.7 F | HEART RATE: 123 BPM | OXYGEN SATURATION: 87 % | WEIGHT: 113 LBS | SYSTOLIC BLOOD PRESSURE: 126 MMHG

## 2019-01-01 VITALS
SYSTOLIC BLOOD PRESSURE: 100 MMHG | OXYGEN SATURATION: 97 % | WEIGHT: 102 LBS | DIASTOLIC BLOOD PRESSURE: 70 MMHG | HEART RATE: 85 BPM | BODY MASS INDEX: 20.03 KG/M2 | HEIGHT: 60 IN

## 2019-01-01 VITALS
BODY MASS INDEX: 19.83 KG/M2 | DIASTOLIC BLOOD PRESSURE: 64 MMHG | SYSTOLIC BLOOD PRESSURE: 122 MMHG | WEIGHT: 101 LBS | HEIGHT: 60 IN

## 2019-01-01 VITALS
BODY MASS INDEX: 21.02 KG/M2 | OXYGEN SATURATION: 93 % | SYSTOLIC BLOOD PRESSURE: 157 MMHG | HEIGHT: 59 IN | TEMPERATURE: 97.8 F | HEART RATE: 81 BPM | DIASTOLIC BLOOD PRESSURE: 86 MMHG | WEIGHT: 104.28 LBS | RESPIRATION RATE: 24 BRPM

## 2019-01-01 VITALS
WEIGHT: 102 LBS | SYSTOLIC BLOOD PRESSURE: 136 MMHG | BODY MASS INDEX: 20.03 KG/M2 | DIASTOLIC BLOOD PRESSURE: 84 MMHG | HEIGHT: 60 IN

## 2019-01-01 DIAGNOSIS — M40.03 POSTURAL KYPHOSIS OF CERVICOTHORACIC REGION: ICD-10-CM
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
## 2019-01-01 DIAGNOSIS — R91.1 PULMONARY NODULE: Primary | ICD-10-CM

## 2019-01-01 DIAGNOSIS — D22.9 MULTIPLE NEVI: ICD-10-CM

## 2019-01-01 DIAGNOSIS — R41.82 ALTERED MENTAL STATUS, UNSPECIFIED ALTERED MENTAL STATUS TYPE: Primary | ICD-10-CM

## 2019-01-01 DIAGNOSIS — Z85.828 HISTORY OF NONMELANOMA SKIN CANCER: ICD-10-CM

## 2019-01-01 DIAGNOSIS — Z87.2 HISTORY OF DERMATITIS: ICD-10-CM

## 2019-01-01 DIAGNOSIS — J18.9 PNEUMONIA DUE TO INFECTIOUS ORGANISM, UNSPECIFIED LATERALITY, UNSPECIFIED PART OF LUNG: Primary | ICD-10-CM

## 2019-01-01 DIAGNOSIS — R91.1 PULMONARY NODULE: ICD-10-CM

## 2019-01-01 DIAGNOSIS — Z85.118 HISTORY OF LUNG CANCER: ICD-10-CM

## 2019-01-01 DIAGNOSIS — R53.83 FATIGUE, UNSPECIFIED TYPE: ICD-10-CM

## 2019-01-01 DIAGNOSIS — Z91.81 AT HIGH RISK FOR FALLS: ICD-10-CM

## 2019-01-01 DIAGNOSIS — S62.102A WRIST FRACTURE, CLOSED, LEFT, INITIAL ENCOUNTER: ICD-10-CM

## 2019-01-01 DIAGNOSIS — J43.8 OTHER EMPHYSEMA (HCC): Primary | ICD-10-CM

## 2019-01-01 DIAGNOSIS — R09.02 HYPOXIA: ICD-10-CM
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
## 2019-01-01 DIAGNOSIS — J11.1 FLU: ICD-10-CM

## 2019-01-01 DIAGNOSIS — L71.9 ROSACEA: ICD-10-CM

## 2019-01-01 DIAGNOSIS — R06.09 DOE (DYSPNEA ON EXERTION): Primary | ICD-10-CM

## 2019-01-01 DIAGNOSIS — L82.1 SEBORRHEIC KERATOSIS: Primary | ICD-10-CM

## 2019-01-01 DIAGNOSIS — L57.0 AK (ACTINIC KERATOSIS): ICD-10-CM

## 2019-01-01 DIAGNOSIS — F32.A DEPRESSION, UNSPECIFIED DEPRESSION TYPE: ICD-10-CM

## 2019-01-01 DIAGNOSIS — J43.8 OTHER EMPHYSEMA (HCC): ICD-10-CM

## 2019-01-01 DIAGNOSIS — S01.01XA LACERATION OF SCALP, INITIAL ENCOUNTER: Primary | ICD-10-CM

## 2019-01-01 LAB
A/G RATIO: 1.6 (ref 1.1–2.2)
A/G RATIO: 2.2 (ref 1.1–2.2)
ALBUMIN SERPL-MCNC: 3.6 G/DL (ref 3.4–5)
ALBUMIN SERPL-MCNC: 3.6 G/DL (ref 3.4–5)
ALBUMIN SERPL-MCNC: 4.6 G/DL (ref 3.4–5)
ALP BLD-CCNC: 70 U/L (ref 40–129)
ALP BLD-CCNC: 77 U/L (ref 40–129)
ALT SERPL-CCNC: 13 U/L (ref 10–40)
ALT SERPL-CCNC: 14 U/L (ref 10–40)
AMORPHOUS: ABNORMAL /HPF
ANION GAP SERPL CALCULATED.3IONS-SCNC: 10 MMOL/L (ref 3–16)
ANION GAP SERPL CALCULATED.3IONS-SCNC: 10 MMOL/L (ref 3–16)
ANION GAP SERPL CALCULATED.3IONS-SCNC: 11 MMOL/L (ref 3–16)
ANION GAP SERPL CALCULATED.3IONS-SCNC: 14 MMOL/L (ref 3–16)
ANION GAP SERPL CALCULATED.3IONS-SCNC: 5 MMOL/L (ref 3–16)
ANION GAP SERPL CALCULATED.3IONS-SCNC: 7 MMOL/L (ref 3–16)
ANION GAP SERPL CALCULATED.3IONS-SCNC: 7 MMOL/L (ref 3–16)
ANION GAP SERPL CALCULATED.3IONS-SCNC: 9 MMOL/L (ref 3–16)
ANION GAP SERPL CALCULATED.3IONS-SCNC: 9 MMOL/L (ref 3–16)
AST SERPL-CCNC: 15 U/L (ref 15–37)
AST SERPL-CCNC: 23 U/L (ref 15–37)
BACTERIA: ABNORMAL /HPF
BACTERIA: ABNORMAL /HPF
BANDED NEUTROPHILS RELATIVE PERCENT: 6 % (ref 0–7)
BASE EXCESS ARTERIAL: 0 MMOL/L (ref -3–3)
BASE EXCESS VENOUS: 11 (ref -3–3)
BASE EXCESS VENOUS: 5 (ref -3–3)
BASOPHILS ABSOLUTE: 0 K/UL (ref 0–0.2)
BASOPHILS ABSOLUTE: 0.1 K/UL (ref 0–0.2)
BASOPHILS ABSOLUTE: 0.1 K/UL (ref 0–0.2)
BASOPHILS RELATIVE PERCENT: 0 %
BASOPHILS RELATIVE PERCENT: 0.1 %
BASOPHILS RELATIVE PERCENT: 0.2 %
BASOPHILS RELATIVE PERCENT: 0.4 %
BASOPHILS RELATIVE PERCENT: 0.5 %
BILIRUB SERPL-MCNC: 0.4 MG/DL (ref 0–1)
BILIRUB SERPL-MCNC: 0.4 MG/DL (ref 0–1)
BILIRUBIN URINE: ABNORMAL
BILIRUBIN URINE: NEGATIVE
BLOOD CULTURE, ROUTINE: NORMAL
BLOOD, URINE: ABNORMAL
BLOOD, URINE: NEGATIVE
BUN BLDV-MCNC: 13 MG/DL (ref 7–20)
BUN BLDV-MCNC: 13 MG/DL (ref 7–20)
BUN BLDV-MCNC: 18 MG/DL (ref 7–20)
BUN BLDV-MCNC: 18 MG/DL (ref 7–20)
BUN BLDV-MCNC: 20 MG/DL (ref 7–20)
BUN BLDV-MCNC: 20 MG/DL (ref 7–20)
BUN BLDV-MCNC: 24 MG/DL (ref 7–20)
BUN BLDV-MCNC: 24 MG/DL (ref 7–20)
BUN BLDV-MCNC: 28 MG/DL (ref 7–20)
CALCIUM SERPL-MCNC: 10.1 MG/DL (ref 8.3–10.6)
CALCIUM SERPL-MCNC: 8.5 MG/DL (ref 8.3–10.6)
CALCIUM SERPL-MCNC: 8.6 MG/DL (ref 8.3–10.6)
CALCIUM SERPL-MCNC: 8.7 MG/DL (ref 8.3–10.6)
CALCIUM SERPL-MCNC: 8.9 MG/DL (ref 8.3–10.6)
CALCIUM SERPL-MCNC: 9.1 MG/DL (ref 8.3–10.6)
CALCIUM SERPL-MCNC: 9.5 MG/DL (ref 8.3–10.6)
CARBOXYHEMOGLOBIN ARTERIAL: 1.2 % (ref 0–1.5)
CHLORIDE BLD-SCNC: 87 MMOL/L (ref 99–110)
CHLORIDE BLD-SCNC: 90 MMOL/L (ref 99–110)
CHLORIDE BLD-SCNC: 91 MMOL/L (ref 99–110)
CHLORIDE BLD-SCNC: 92 MMOL/L (ref 99–110)
CHLORIDE BLD-SCNC: 95 MMOL/L (ref 99–110)
CLARITY: CLEAR
CLARITY: CLEAR
CO2: 26 MMOL/L (ref 21–32)
CO2: 29 MMOL/L (ref 21–32)
CO2: 29 MMOL/L (ref 21–32)
CO2: 30 MMOL/L (ref 21–32)
CO2: 30 MMOL/L (ref 21–32)
CO2: 33 MMOL/L (ref 21–32)
CO2: 34 MMOL/L (ref 21–32)
CO2: 34 MMOL/L (ref 21–32)
CO2: 37 MMOL/L (ref 21–32)
COLOR: YELLOW
COLOR: YELLOW
CREAT SERPL-MCNC: <0.5 MG/DL (ref 0.6–1.2)
CULTURE, BLOOD 2: NORMAL
EKG ATRIAL RATE: 188 BPM
EKG ATRIAL RATE: 97 BPM
EKG DIAGNOSIS: NORMAL
EKG DIAGNOSIS: NORMAL
EKG P AXIS: 51 DEGREES
EKG P AXIS: 6 DEGREES
EKG P-R INTERVAL: 132 MS
EKG Q-T INTERVAL: 342 MS
EKG Q-T INTERVAL: 368 MS
EKG QRS DURATION: 106 MS
EKG QRS DURATION: 94 MS
EKG QTC CALCULATION (BAZETT): 434 MS
EKG QTC CALCULATION (BAZETT): 469 MS
EKG R AXIS: -17 DEGREES
EKG R AXIS: -28 DEGREES
EKG T AXIS: 23 DEGREES
EKG T AXIS: 40 DEGREES
EKG VENTRICULAR RATE: 97 BPM
EKG VENTRICULAR RATE: 98 BPM
EOSINOPHILS ABSOLUTE: 0 K/UL (ref 0–0.6)
EOSINOPHILS ABSOLUTE: 0.2 K/UL (ref 0–0.6)
EOSINOPHILS RELATIVE PERCENT: 0 %
EOSINOPHILS RELATIVE PERCENT: 0.2 %
EOSINOPHILS RELATIVE PERCENT: 0.6 %
EOSINOPHILS RELATIVE PERCENT: 1.1 %
EPITHELIAL CELLS, UA: ABNORMAL /HPF
EPITHELIAL CELLS, UA: ABNORMAL /HPF
GFR AFRICAN AMERICAN: >60
GFR NON-AFRICAN AMERICAN: >60
GLOBULIN: 2.1 G/DL
GLOBULIN: 2.3 G/DL
GLUCOSE BLD-MCNC: 106 MG/DL (ref 70–99)
GLUCOSE BLD-MCNC: 120 MG/DL (ref 70–99)
GLUCOSE BLD-MCNC: 124 MG/DL (ref 70–99)
GLUCOSE BLD-MCNC: 147 MG/DL (ref 70–99)
GLUCOSE BLD-MCNC: 215 MG/DL (ref 70–99)
GLUCOSE BLD-MCNC: 225 MG/DL (ref 70–99)
GLUCOSE BLD-MCNC: 91 MG/DL (ref 70–99)
GLUCOSE BLD-MCNC: 92 MG/DL (ref 70–99)
GLUCOSE BLD-MCNC: 96 MG/DL (ref 70–99)
GLUCOSE URINE: NEGATIVE MG/DL
GLUCOSE URINE: NEGATIVE MG/DL
HCO3 ARTERIAL: 31 MMOL/L (ref 21–29)
HCO3 VENOUS: 31.3 MMOL/L (ref 23–29)
HCO3 VENOUS: 36.7 MMOL/L (ref 23–29)
HCT VFR BLD CALC: 35.4 % (ref 36–48)
HCT VFR BLD CALC: 36 % (ref 36–48)
HCT VFR BLD CALC: 36.1 % (ref 36–48)
HCT VFR BLD CALC: 36.3 % (ref 36–48)
HCT VFR BLD CALC: 36.8 % (ref 36–48)
HCT VFR BLD CALC: 37.5 % (ref 36–48)
HCT VFR BLD CALC: 41 % (ref 36–48)
HCT VFR BLD CALC: 41.1 % (ref 36–48)
HEMOGLOBIN, ART, EXTENDED: 11.7 G/DL
HEMOGLOBIN: 12 G/DL (ref 12–16)
HEMOGLOBIN: 12.1 G/DL (ref 12–16)
HEMOGLOBIN: 12.3 G/DL (ref 12–16)
HEMOGLOBIN: 12.3 G/DL (ref 12–16)
HEMOGLOBIN: 12.4 G/DL (ref 12–16)
HEMOGLOBIN: 12.6 G/DL (ref 12–16)
HEMOGLOBIN: 13.5 G/DL (ref 12–16)
HEMOGLOBIN: 13.8 G/DL (ref 12–16)
KETONES, URINE: ABNORMAL MG/DL
KETONES, URINE: NEGATIVE MG/DL
L. PNEUMOPHILA SEROGP 1 UR AG: NORMAL
LACTATE: 1.21 MMOL/L (ref 0.4–2)
LACTATE: 1.58 MMOL/L (ref 0.4–2)
LEUKOCYTE ESTERASE, URINE: NEGATIVE
LEUKOCYTE ESTERASE, URINE: NEGATIVE
LYMPHOCYTES ABSOLUTE: 0.2 K/UL (ref 1–5.1)
LYMPHOCYTES ABSOLUTE: 0.5 K/UL (ref 1–5.1)
LYMPHOCYTES ABSOLUTE: 0.5 K/UL (ref 1–5.1)
LYMPHOCYTES ABSOLUTE: 0.6 K/UL (ref 1–5.1)
LYMPHOCYTES ABSOLUTE: 0.7 K/UL (ref 1–5.1)
LYMPHOCYTES ABSOLUTE: 0.9 K/UL (ref 1–5.1)
LYMPHOCYTES ABSOLUTE: 1.1 K/UL (ref 1–5.1)
LYMPHOCYTES ABSOLUTE: 1.2 K/UL (ref 1–5.1)
LYMPHOCYTES RELATIVE PERCENT: 1.4 %
LYMPHOCYTES RELATIVE PERCENT: 12.1 %
LYMPHOCYTES RELATIVE PERCENT: 13.3 %
LYMPHOCYTES RELATIVE PERCENT: 3.6 %
LYMPHOCYTES RELATIVE PERCENT: 4.3 %
LYMPHOCYTES RELATIVE PERCENT: 5 %
LYMPHOCYTES RELATIVE PERCENT: 5.5 %
LYMPHOCYTES RELATIVE PERCENT: 8.1 %
MAGNESIUM: 1.9 MG/DL (ref 1.8–2.4)
MCH RBC QN AUTO: 30.2 PG (ref 26–34)
MCH RBC QN AUTO: 30.3 PG (ref 26–34)
MCH RBC QN AUTO: 30.4 PG (ref 26–34)
MCH RBC QN AUTO: 30.5 PG (ref 26–34)
MCH RBC QN AUTO: 30.6 PG (ref 26–34)
MCH RBC QN AUTO: 30.7 PG (ref 26–34)
MCH RBC QN AUTO: 30.8 PG (ref 26–34)
MCH RBC QN AUTO: 30.9 PG (ref 26–34)
MCHC RBC AUTO-ENTMCNC: 32.9 G/DL (ref 31–36)
MCHC RBC AUTO-ENTMCNC: 33.3 G/DL (ref 31–36)
MCHC RBC AUTO-ENTMCNC: 33.5 G/DL (ref 31–36)
MCHC RBC AUTO-ENTMCNC: 33.5 G/DL (ref 31–36)
MCHC RBC AUTO-ENTMCNC: 33.7 G/DL (ref 31–36)
MCHC RBC AUTO-ENTMCNC: 33.9 G/DL (ref 31–36)
MCHC RBC AUTO-ENTMCNC: 34.1 G/DL (ref 31–36)
MCHC RBC AUTO-ENTMCNC: 34.1 G/DL (ref 31–36)
MCV RBC AUTO: 89.7 FL (ref 80–100)
MCV RBC AUTO: 89.9 FL (ref 80–100)
MCV RBC AUTO: 90.1 FL (ref 80–100)
MCV RBC AUTO: 90.4 FL (ref 80–100)
MCV RBC AUTO: 91.3 FL (ref 80–100)
MCV RBC AUTO: 91.5 FL (ref 80–100)
MCV RBC AUTO: 91.7 FL (ref 80–100)
MCV RBC AUTO: 91.9 FL (ref 80–100)
METHEMOGLOBIN ARTERIAL: 0.7 % (ref 0–1.4)
MICROSCOPIC EXAMINATION: YES
MICROSCOPIC EXAMINATION: YES
MONOCYTES ABSOLUTE: 0.8 K/UL (ref 0–1.3)
MONOCYTES ABSOLUTE: 0.8 K/UL (ref 0–1.3)
MONOCYTES ABSOLUTE: 0.9 K/UL (ref 0–1.3)
MONOCYTES ABSOLUTE: 0.9 K/UL (ref 0–1.3)
MONOCYTES ABSOLUTE: 1 K/UL (ref 0–1.3)
MONOCYTES ABSOLUTE: 1 K/UL (ref 0–1.3)
MONOCYTES ABSOLUTE: 1.1 K/UL (ref 0–1.3)
MONOCYTES ABSOLUTE: 1.8 K/UL (ref 0–1.3)
MONOCYTES RELATIVE PERCENT: 11.8 %
MONOCYTES RELATIVE PERCENT: 12.3 %
MONOCYTES RELATIVE PERCENT: 13 %
MONOCYTES RELATIVE PERCENT: 5.9 %
MONOCYTES RELATIVE PERCENT: 5.9 %
MONOCYTES RELATIVE PERCENT: 6.7 %
MONOCYTES RELATIVE PERCENT: 7.5 %
MONOCYTES RELATIVE PERCENT: 8 %
NEUTROPHILS ABSOLUTE: 11.4 K/UL (ref 1.7–7.7)
NEUTROPHILS ABSOLUTE: 12.1 K/UL (ref 1.7–7.7)
NEUTROPHILS ABSOLUTE: 13.1 K/UL (ref 1.7–7.7)
NEUTROPHILS ABSOLUTE: 14.3 K/UL (ref 1.7–7.7)
NEUTROPHILS ABSOLUTE: 19.4 K/UL (ref 1.7–7.7)
NEUTROPHILS ABSOLUTE: 4.6 K/UL (ref 1.7–7.7)
NEUTROPHILS ABSOLUTE: 5.2 K/UL (ref 1.7–7.7)
NEUTROPHILS ABSOLUTE: 7.6 K/UL (ref 1.7–7.7)
NEUTROPHILS RELATIVE PERCENT: 73.7 %
NEUTROPHILS RELATIVE PERCENT: 74.6 %
NEUTROPHILS RELATIVE PERCENT: 81 %
NEUTROPHILS RELATIVE PERCENT: 82.6 %
NEUTROPHILS RELATIVE PERCENT: 84.4 %
NEUTROPHILS RELATIVE PERCENT: 88.8 %
NEUTROPHILS RELATIVE PERCENT: 89.6 %
NEUTROPHILS RELATIVE PERCENT: 91.5 %
NITRITE, URINE: NEGATIVE
NITRITE, URINE: NEGATIVE
O2 SAT, ARTERIAL: 92 % (ref 93–100)
O2 SAT, VEN: 63 %
O2 SAT, VEN: 77 %
ORGANISM: ABNORMAL
OVALOCYTES: ABNORMAL
PCO2 ARTERIAL: 91.5 MMHG (ref 35–45)
PCO2, VEN: 59.3 MM HG (ref 40–50)
PCO2, VEN: 71.1 MM HG (ref 40–50)
PDW BLD-RTO: 13.8 % (ref 12.4–15.4)
PDW BLD-RTO: 14.4 % (ref 12.4–15.4)
PDW BLD-RTO: 14.7 % (ref 12.4–15.4)
PDW BLD-RTO: 14.7 % (ref 12.4–15.4)
PDW BLD-RTO: 14.8 % (ref 12.4–15.4)
PDW BLD-RTO: 14.8 % (ref 12.4–15.4)
PDW BLD-RTO: 14.9 % (ref 12.4–15.4)
PDW BLD-RTO: 15.1 % (ref 12.4–15.4)
PERFORMED ON: ABNORMAL
PERFORMED ON: ABNORMAL
PH ARTERIAL: 7.16 (ref 7.35–7.45)
PH UA: 6 (ref 5–8)
PH UA: 6 (ref 5–8)
PH VENOUS: 7.32 (ref 7.35–7.45)
PH VENOUS: 7.33 (ref 7.35–7.45)
PHOSPHORUS: 2.3 MG/DL (ref 2.5–4.9)
PLATELET # BLD: 258 K/UL (ref 135–450)
PLATELET # BLD: 263 K/UL (ref 135–450)
PLATELET # BLD: 304 K/UL (ref 135–450)
PLATELET # BLD: 315 K/UL (ref 135–450)
PLATELET # BLD: 319 K/UL (ref 135–450)
PLATELET # BLD: 364 K/UL (ref 135–450)
PLATELET # BLD: 494 K/UL (ref 135–450)
PLATELET # BLD: 511 K/UL (ref 135–450)
PMV BLD AUTO: 7.7 FL (ref 5–10.5)
PMV BLD AUTO: 7.8 FL (ref 5–10.5)
PMV BLD AUTO: 8.2 FL (ref 5–10.5)
PMV BLD AUTO: 8.3 FL (ref 5–10.5)
PMV BLD AUTO: 8.3 FL (ref 5–10.5)
PMV BLD AUTO: 8.7 FL (ref 5–10.5)
PMV BLD AUTO: 8.7 FL (ref 5–10.5)
PMV BLD AUTO: 8.8 FL (ref 5–10.5)
PO2 ARTERIAL: 75.6 MMHG (ref 75–108)
PO2, VEN: 36 MM HG
PO2, VEN: 47 MM HG
POC SAMPLE TYPE: ABNORMAL
POC SAMPLE TYPE: ABNORMAL
POTASSIUM REFLEX MAGNESIUM: 3.8 MMOL/L (ref 3.5–5.1)
POTASSIUM REFLEX MAGNESIUM: 4.6 MMOL/L (ref 3.5–5.1)
POTASSIUM SERPL-SCNC: 4 MMOL/L (ref 3.5–5.1)
POTASSIUM SERPL-SCNC: 4.1 MMOL/L (ref 3.5–5.1)
POTASSIUM SERPL-SCNC: 4.1 MMOL/L (ref 3.5–5.1)
POTASSIUM SERPL-SCNC: 4.3 MMOL/L (ref 3.5–5.1)
POTASSIUM SERPL-SCNC: 4.7 MMOL/L (ref 3.5–5.1)
PRO-BNP: 4835 PG/ML (ref 0–449)
PRO-BNP: ABNORMAL PG/ML (ref 0–449)
PROTEIN UA: 100 MG/DL
PROTEIN UA: >=300 MG/DL
RAPID INFLUENZA  B AGN: NEGATIVE
RAPID INFLUENZA A AGN: POSITIVE
RBC # BLD: 3.91 M/UL (ref 4–5.2)
RBC # BLD: 3.94 M/UL (ref 4–5.2)
RBC # BLD: 4.01 M/UL (ref 4–5.2)
RBC # BLD: 4.04 M/UL (ref 4–5.2)
RBC # BLD: 4.11 M/UL (ref 4–5.2)
RBC # BLD: 4.11 M/UL (ref 4–5.2)
RBC # BLD: 4.46 M/UL (ref 4–5.2)
RBC # BLD: 4.48 M/UL (ref 4–5.2)
RBC UA: ABNORMAL /HPF (ref 0–2)
RBC UA: ABNORMAL /HPF (ref 0–2)
RENAL EPITHELIAL, UA: ABNORMAL /HPF
REPORT: NORMAL
RESPIRATORY PANEL PCR: ABNORMAL
RESPIRATORY PANEL PCR: ABNORMAL
SCHISTOCYTES: ABNORMAL
SODIUM BLD-SCNC: 127 MMOL/L (ref 136–145)
SODIUM BLD-SCNC: 127 MMOL/L (ref 136–145)
SODIUM BLD-SCNC: 129 MMOL/L (ref 136–145)
SODIUM BLD-SCNC: 131 MMOL/L (ref 136–145)
SODIUM BLD-SCNC: 134 MMOL/L (ref 136–145)
SODIUM BLD-SCNC: 135 MMOL/L (ref 136–145)
SODIUM BLD-SCNC: 138 MMOL/L (ref 136–145)
SPECIFIC GRAVITY UA: >=1.03 (ref 1–1.03)
SPECIFIC GRAVITY UA: >=1.03 (ref 1–1.03)
STREP PNEUMONIAE ANTIGEN, URINE: ABNORMAL
TCO2 ARTERIAL: 34 MMOL/L
TCO2 CALC VENOUS: 33 MMOL/L
TCO2 CALC VENOUS: 39 MMOL/L
TOTAL PROTEIN: 5.9 G/DL (ref 6.4–8.2)
TOTAL PROTEIN: 6.7 G/DL (ref 6.4–8.2)
TROPONIN: <0.01 NG/ML
TROPONIN: <0.01 NG/ML
TSH SERPL DL<=0.05 MIU/L-ACNC: 2.17 UIU/ML (ref 0.27–4.2)
URINE CULTURE, ROUTINE: NORMAL
URINE REFLEX TO CULTURE: ABNORMAL
URINE TYPE: ABNORMAL
URINE TYPE: ABNORMAL
UROBILINOGEN, URINE: 0.2 E.U./DL
UROBILINOGEN, URINE: 0.2 E.U./DL
WBC # BLD: 12.8 K/UL (ref 4–11)
WBC # BLD: 14.3 K/UL (ref 4–11)
WBC # BLD: 14.6 K/UL (ref 4–11)
WBC # BLD: 15.6 K/UL (ref 4–11)
WBC # BLD: 22.3 K/UL (ref 4–11)
WBC # BLD: 6.2 K/UL (ref 4–11)
WBC # BLD: 7.1 K/UL (ref 4–11)
WBC # BLD: 9.2 K/UL (ref 4–11)
WBC UA: ABNORMAL /HPF (ref 0–5)
WBC UA: ABNORMAL /HPF (ref 0–5)

## 2019-01-01 PROCEDURE — 4040F PNEUMOC VAC/ADMIN/RCVD: CPT | Performed by: INTERNAL MEDICINE

## 2019-01-01 PROCEDURE — 94640 AIRWAY INHALATION TREATMENT: CPT

## 2019-01-01 PROCEDURE — G8482 FLU IMMUNIZE ORDER/ADMIN: HCPCS | Performed by: INTERNAL MEDICINE

## 2019-01-01 PROCEDURE — 2700000000 HC OXYGEN THERAPY PER DAY

## 2019-01-01 PROCEDURE — 6360000002 HC RX W HCPCS: Performed by: STUDENT IN AN ORGANIZED HEALTH CARE EDUCATION/TRAINING PROGRAM

## 2019-01-01 PROCEDURE — 2580000003 HC RX 258: Performed by: STUDENT IN AN ORGANIZED HEALTH CARE EDUCATION/TRAINING PROGRAM

## 2019-01-01 PROCEDURE — 92526 ORAL FUNCTION THERAPY: CPT

## 2019-01-01 PROCEDURE — 1123F ACP DISCUSS/DSCN MKR DOCD: CPT | Performed by: INTERNAL MEDICINE

## 2019-01-01 PROCEDURE — G8427 DOCREV CUR MEDS BY ELIG CLIN: HCPCS | Performed by: INTERNAL MEDICINE

## 2019-01-01 PROCEDURE — G8420 CALC BMI NORM PARAMETERS: HCPCS | Performed by: INTERNAL MEDICINE

## 2019-01-01 PROCEDURE — 80048 BASIC METABOLIC PNL TOTAL CA: CPT

## 2019-01-01 PROCEDURE — 1101F PT FALLS ASSESS-DOCD LE1/YR: CPT | Performed by: INTERNAL MEDICINE

## 2019-01-01 PROCEDURE — 94761 N-INVAS EAR/PLS OXIMETRY MLT: CPT

## 2019-01-01 PROCEDURE — 85025 COMPLETE CBC W/AUTO DIFF WBC: CPT

## 2019-01-01 PROCEDURE — 93005 ELECTROCARDIOGRAM TRACING: CPT | Performed by: PHYSICIAN ASSISTANT

## 2019-01-01 PROCEDURE — 97116 GAIT TRAINING THERAPY: CPT

## 2019-01-01 PROCEDURE — 73110 X-RAY EXAM OF WRIST: CPT

## 2019-01-01 PROCEDURE — 94799 UNLISTED PULMONARY SVC/PX: CPT

## 2019-01-01 PROCEDURE — 6370000000 HC RX 637 (ALT 250 FOR IP): Performed by: STUDENT IN AN ORGANIZED HEALTH CARE EDUCATION/TRAINING PROGRAM

## 2019-01-01 PROCEDURE — 96361 HYDRATE IV INFUSION ADD-ON: CPT

## 2019-01-01 PROCEDURE — 83605 ASSAY OF LACTIC ACID: CPT

## 2019-01-01 PROCEDURE — 87486 CHLMYD PNEUM DNA AMP PROBE: CPT

## 2019-01-01 PROCEDURE — 96360 HYDRATION IV INFUSION INIT: CPT

## 2019-01-01 PROCEDURE — 2500000003 HC RX 250 WO HCPCS: Performed by: STUDENT IN AN ORGANIZED HEALTH CARE EDUCATION/TRAINING PROGRAM

## 2019-01-01 PROCEDURE — 99285 EMERGENCY DEPT VISIT HI MDM: CPT

## 2019-01-01 PROCEDURE — 99284 EMERGENCY DEPT VISIT MOD MDM: CPT

## 2019-01-01 PROCEDURE — 99222 1ST HOSP IP/OBS MODERATE 55: CPT | Performed by: INTERNAL MEDICINE

## 2019-01-01 PROCEDURE — 82803 BLOOD GASES ANY COMBINATION: CPT

## 2019-01-01 PROCEDURE — G8926 SPIRO NO PERF OR DOC: HCPCS | Performed by: INTERNAL MEDICINE

## 2019-01-01 PROCEDURE — 6360000002 HC RX W HCPCS: Performed by: INTERNAL MEDICINE

## 2019-01-01 PROCEDURE — 1090F PRES/ABSN URINE INCON ASSESS: CPT | Performed by: DERMATOLOGY

## 2019-01-01 PROCEDURE — 2580000003 HC RX 258: Performed by: PHYSICIAN ASSISTANT

## 2019-01-01 PROCEDURE — 1200000000 HC SEMI PRIVATE

## 2019-01-01 PROCEDURE — 81001 URINALYSIS AUTO W/SCOPE: CPT

## 2019-01-01 PROCEDURE — 96365 THER/PROPH/DIAG IV INF INIT: CPT

## 2019-01-01 PROCEDURE — 87633 RESP VIRUS 12-25 TARGETS: CPT

## 2019-01-01 PROCEDURE — 71046 X-RAY EXAM CHEST 2 VIEWS: CPT

## 2019-01-01 PROCEDURE — 6360000002 HC RX W HCPCS: Performed by: EMERGENCY MEDICINE

## 2019-01-01 PROCEDURE — 17000 DESTRUCT PREMALG LESION: CPT | Performed by: DERMATOLOGY

## 2019-01-01 PROCEDURE — 89220 SPUTUM SPECIMEN COLLECTION: CPT

## 2019-01-01 PROCEDURE — 83735 ASSAY OF MAGNESIUM: CPT

## 2019-01-01 PROCEDURE — 97127 HC OT THER IVNTJ W/FOCUS COG FUNCJ: CPT

## 2019-01-01 PROCEDURE — 93005 ELECTROCARDIOGRAM TRACING: CPT | Performed by: STUDENT IN AN ORGANIZED HEALTH CARE EDUCATION/TRAINING PROGRAM

## 2019-01-01 PROCEDURE — 1090F PRES/ABSN URINE INCON ASSESS: CPT | Performed by: INTERNAL MEDICINE

## 2019-01-01 PROCEDURE — 6370000000 HC RX 637 (ALT 250 FOR IP)

## 2019-01-01 PROCEDURE — 87449 NOS EACH ORGANISM AG IA: CPT

## 2019-01-01 PROCEDURE — 36415 COLL VENOUS BLD VENIPUNCTURE: CPT

## 2019-01-01 PROCEDURE — 99232 SBSQ HOSP IP/OBS MODERATE 35: CPT | Performed by: INTERNAL MEDICINE

## 2019-01-01 PROCEDURE — 1036F TOBACCO NON-USER: CPT | Performed by: INTERNAL MEDICINE

## 2019-01-01 PROCEDURE — 80053 COMPREHEN METABOLIC PANEL: CPT

## 2019-01-01 PROCEDURE — 92610 EVALUATE SWALLOWING FUNCTION: CPT

## 2019-01-01 PROCEDURE — 4040F PNEUMOC VAC/ADMIN/RCVD: CPT | Performed by: DERMATOLOGY

## 2019-01-01 PROCEDURE — 70450 CT HEAD/BRAIN W/O DYE: CPT

## 2019-01-01 PROCEDURE — 99213 OFFICE O/P EST LOW 20 MIN: CPT | Performed by: INTERNAL MEDICINE

## 2019-01-01 PROCEDURE — 71250 CT THORAX DX C-: CPT

## 2019-01-01 PROCEDURE — 94660 CPAP INITIATION&MGMT: CPT

## 2019-01-01 PROCEDURE — 99214 OFFICE O/P EST MOD 30 MIN: CPT | Performed by: INTERNAL MEDICINE

## 2019-01-01 PROCEDURE — 87581 M.PNEUMON DNA AMP PROBE: CPT

## 2019-01-01 PROCEDURE — 90715 TDAP VACCINE 7 YRS/> IM: CPT | Performed by: EMERGENCY MEDICINE

## 2019-01-01 PROCEDURE — 99238 HOSP IP/OBS DSCHRG MGMT 30/<: CPT | Performed by: INTERNAL MEDICINE

## 2019-01-01 PROCEDURE — 3023F SPIROM DOC REV: CPT | Performed by: INTERNAL MEDICINE

## 2019-01-01 PROCEDURE — 2580000003 HC RX 258: Performed by: EMERGENCY MEDICINE

## 2019-01-01 PROCEDURE — G8482 FLU IMMUNIZE ORDER/ADMIN: HCPCS | Performed by: DERMATOLOGY

## 2019-01-01 PROCEDURE — 84484 ASSAY OF TROPONIN QUANT: CPT

## 2019-01-01 PROCEDURE — 94664 DEMO&/EVAL PT USE INHALER: CPT

## 2019-01-01 PROCEDURE — 6370000000 HC RX 637 (ALT 250 FOR IP): Performed by: PHYSICIAN ASSISTANT

## 2019-01-01 PROCEDURE — 83880 ASSAY OF NATRIURETIC PEPTIDE: CPT

## 2019-01-01 PROCEDURE — G8427 DOCREV CUR MEDS BY ELIG CLIN: HCPCS | Performed by: DERMATOLOGY

## 2019-01-01 PROCEDURE — 97530 THERAPEUTIC ACTIVITIES: CPT

## 2019-01-01 PROCEDURE — 87798 DETECT AGENT NOS DNA AMP: CPT

## 2019-01-01 PROCEDURE — 87040 BLOOD CULTURE FOR BACTERIA: CPT

## 2019-01-01 PROCEDURE — 71045 X-RAY EXAM CHEST 1 VIEW: CPT

## 2019-01-01 PROCEDURE — 90471 IMMUNIZATION ADMIN: CPT | Performed by: EMERGENCY MEDICINE

## 2019-01-01 PROCEDURE — 99213 OFFICE O/P EST LOW 20 MIN: CPT | Performed by: DERMATOLOGY

## 2019-01-01 PROCEDURE — 97165 OT EVAL LOW COMPLEX 30 MIN: CPT

## 2019-01-01 PROCEDURE — 1101F PT FALLS ASSESS-DOCD LE1/YR: CPT | Performed by: DERMATOLOGY

## 2019-01-01 PROCEDURE — 87804 INFLUENZA ASSAY W/OPTIC: CPT

## 2019-01-01 PROCEDURE — 80069 RENAL FUNCTION PANEL: CPT

## 2019-01-01 PROCEDURE — 96375 TX/PRO/DX INJ NEW DRUG ADDON: CPT

## 2019-01-01 PROCEDURE — 4500000024 HC ED LEVEL 4 PROCEDURE

## 2019-01-01 PROCEDURE — G8420 CALC BMI NORM PARAMETERS: HCPCS | Performed by: DERMATOLOGY

## 2019-01-01 PROCEDURE — 96367 TX/PROPH/DG ADDL SEQ IV INF: CPT

## 2019-01-01 PROCEDURE — 97161 PT EVAL LOW COMPLEX 20 MIN: CPT

## 2019-01-01 PROCEDURE — 1036F TOBACCO NON-USER: CPT | Performed by: DERMATOLOGY

## 2019-01-01 PROCEDURE — 36600 WITHDRAWAL OF ARTERIAL BLOOD: CPT

## 2019-01-01 PROCEDURE — 87086 URINE CULTURE/COLONY COUNT: CPT

## 2019-01-01 PROCEDURE — 1123F ACP DISCUSS/DSCN MKR DOCD: CPT | Performed by: DERMATOLOGY

## 2019-01-01 RX ORDER — NYSTATIN 100000 [USP'U]/G
POWDER TOPICAL 2 TIMES DAILY PRN
COMMUNITY

## 2019-01-01 RX ORDER — ESCITALOPRAM OXALATE 5 MG/1
5 TABLET ORAL NIGHTLY
COMMUNITY

## 2019-01-01 RX ORDER — SODIUM CHLORIDE 0.9 % (FLUSH) 0.9 %
10 SYRINGE (ML) INJECTION PRN
Status: CANCELLED | OUTPATIENT
Start: 2019-01-01

## 2019-01-01 RX ORDER — ESCITALOPRAM OXALATE 5 MG/1
5 TABLET ORAL NIGHTLY
Status: CANCELLED | OUTPATIENT
Start: 2019-01-01

## 2019-01-01 RX ORDER — BUDESONIDE 0.5 MG/2ML
0.5 INHALANT ORAL 2 TIMES DAILY
Status: CANCELLED | OUTPATIENT
Start: 2019-01-01

## 2019-01-01 RX ORDER — FENTANYL CITRATE 50 UG/ML
INJECTION, SOLUTION INTRAMUSCULAR; INTRAVENOUS
Status: DISCONTINUED
Start: 2019-01-01 | End: 2019-01-01 | Stop reason: WASHOUT

## 2019-01-01 RX ORDER — METOPROLOL SUCCINATE 25 MG/1
25 TABLET, EXTENDED RELEASE ORAL DAILY
Status: DISCONTINUED | OUTPATIENT
Start: 2019-01-01 | End: 2019-01-01

## 2019-01-01 RX ORDER — PREDNISONE 20 MG/1
40 TABLET ORAL DAILY
Status: COMPLETED | OUTPATIENT
Start: 2019-01-01 | End: 2019-01-01

## 2019-01-01 RX ORDER — 0.9 % SODIUM CHLORIDE 0.9 %
30 INTRAVENOUS SOLUTION INTRAVENOUS ONCE
Status: COMPLETED | OUTPATIENT
Start: 2019-01-01 | End: 2019-01-01

## 2019-01-01 RX ORDER — BUDESONIDE 0.5 MG/2ML
0.5 INHALANT ORAL 2 TIMES DAILY
Status: DISCONTINUED | OUTPATIENT
Start: 2019-01-01 | End: 2019-01-01

## 2019-01-01 RX ORDER — ALBUTEROL SULFATE 2.5 MG/3ML
2.5 SOLUTION RESPIRATORY (INHALATION) EVERY 6 HOURS PRN
Status: DISCONTINUED | OUTPATIENT
Start: 2019-01-01 | End: 2019-01-01 | Stop reason: HOSPADM

## 2019-01-01 RX ORDER — BUDESONIDE 0.5 MG/2ML
0.5 INHALANT ORAL PRN
Status: DISCONTINUED | OUTPATIENT
Start: 2019-01-01 | End: 2019-01-01 | Stop reason: HOSPADM

## 2019-01-01 RX ORDER — OSELTAMIVIR PHOSPHATE 75 MG/1
75 CAPSULE ORAL ONCE
Status: DISCONTINUED | OUTPATIENT
Start: 2019-01-01 | End: 2019-01-01

## 2019-01-01 RX ORDER — IPRATROPIUM BROMIDE AND ALBUTEROL SULFATE 2.5; .5 MG/3ML; MG/3ML
1 SOLUTION RESPIRATORY (INHALATION) ONCE
Status: COMPLETED | OUTPATIENT
Start: 2019-01-01 | End: 2019-01-01

## 2019-01-01 RX ORDER — ALBUTEROL SULFATE 2.5 MG/3ML
2.5 SOLUTION RESPIRATORY (INHALATION) 2 TIMES DAILY
COMMUNITY

## 2019-01-01 RX ORDER — ESCITALOPRAM OXALATE 5 MG/1
5 TABLET ORAL NIGHTLY
Status: DISCONTINUED | OUTPATIENT
Start: 2019-01-01 | End: 2019-01-01

## 2019-01-01 RX ORDER — MORPHINE SULFATE 2 MG/ML
1 INJECTION, SOLUTION INTRAMUSCULAR; INTRAVENOUS ONCE
Status: DISCONTINUED | OUTPATIENT
Start: 2019-01-01 | End: 2019-01-01

## 2019-01-01 RX ORDER — LABETALOL HYDROCHLORIDE 5 MG/ML
5 INJECTION, SOLUTION INTRAVENOUS ONCE
Status: COMPLETED | OUTPATIENT
Start: 2019-01-01 | End: 2019-01-01

## 2019-01-01 RX ORDER — ACETAMINOPHEN 325 MG/1
650 TABLET ORAL EVERY 4 HOURS PRN
Status: CANCELLED | OUTPATIENT
Start: 2019-01-01

## 2019-01-01 RX ORDER — ALBUTEROL SULFATE 2.5 MG/3ML
2.5 SOLUTION RESPIRATORY (INHALATION) ONCE
Status: COMPLETED | OUTPATIENT
Start: 2019-01-01 | End: 2019-01-01

## 2019-01-01 RX ORDER — NYSTATIN 100000 U/G
CREAM TOPICAL 2 TIMES DAILY
Status: DISCONTINUED | OUTPATIENT
Start: 2019-01-01 | End: 2019-01-01 | Stop reason: HOSPADM

## 2019-01-01 RX ORDER — MAGNESIUM SULFATE 1 G/100ML
1 INJECTION INTRAVENOUS ONCE
Status: COMPLETED | OUTPATIENT
Start: 2019-01-01 | End: 2019-01-01

## 2019-01-01 RX ORDER — ACETAMINOPHEN 325 MG/1
650 TABLET ORAL EVERY 4 HOURS PRN
Status: DISCONTINUED | OUTPATIENT
Start: 2019-01-01 | End: 2019-01-01 | Stop reason: HOSPADM

## 2019-01-01 RX ORDER — ALBUTEROL SULFATE 2.5 MG/3ML
2.5 SOLUTION RESPIRATORY (INHALATION) 2 TIMES DAILY
Status: DISCONTINUED | OUTPATIENT
Start: 2019-01-01 | End: 2019-01-01

## 2019-01-01 RX ORDER — METHYLPREDNISOLONE SODIUM SUCCINATE 125 MG/2ML
125 INJECTION, POWDER, LYOPHILIZED, FOR SOLUTION INTRAMUSCULAR; INTRAVENOUS ONCE
Status: COMPLETED | OUTPATIENT
Start: 2019-01-01 | End: 2019-01-01

## 2019-01-01 RX ORDER — SODIUM CHLORIDE 0.9 % (FLUSH) 0.9 %
10 SYRINGE (ML) INJECTION EVERY 12 HOURS SCHEDULED
Status: CANCELLED | OUTPATIENT
Start: 2019-01-01

## 2019-01-01 RX ORDER — PREDNISONE 20 MG/1
40 TABLET ORAL DAILY
Status: DISCONTINUED | OUTPATIENT
Start: 2019-01-01 | End: 2019-01-01

## 2019-01-01 RX ORDER — SODIUM CHLORIDE 0.9 % (FLUSH) 0.9 %
10 SYRINGE (ML) INJECTION PRN
Status: DISCONTINUED | OUTPATIENT
Start: 2019-01-01 | End: 2019-01-01 | Stop reason: HOSPADM

## 2019-01-01 RX ORDER — MORPHINE SULFATE 2 MG/ML
2 INJECTION, SOLUTION INTRAMUSCULAR; INTRAVENOUS
Status: DISCONTINUED | OUTPATIENT
Start: 2019-01-01 | End: 2019-01-01 | Stop reason: HOSPADM

## 2019-01-01 RX ORDER — MORPHINE SULFATE 4 MG/ML
4 INJECTION, SOLUTION INTRAMUSCULAR; INTRAVENOUS
Status: DISCONTINUED | OUTPATIENT
Start: 2019-01-01 | End: 2019-01-01 | Stop reason: HOSPADM

## 2019-01-01 RX ORDER — METOPROLOL SUCCINATE 25 MG/1
25 TABLET, EXTENDED RELEASE ORAL DAILY
Status: CANCELLED | OUTPATIENT
Start: 2019-01-01

## 2019-01-01 RX ORDER — AMOXICILLIN AND CLAVULANATE POTASSIUM 875; 125 MG/1; MG/1
1 TABLET, FILM COATED ORAL EVERY 12 HOURS SCHEDULED
Qty: 8 TABLET | Refills: 0 | Status: SHIPPED | OUTPATIENT
Start: 2019-01-01 | End: 2019-01-01

## 2019-01-01 RX ORDER — GUAIFENESIN 600 MG/1
1200 TABLET, EXTENDED RELEASE ORAL 2 TIMES DAILY
COMMUNITY

## 2019-01-01 RX ORDER — SODIUM CHLORIDE 0.9 % (FLUSH) 0.9 %
10 SYRINGE (ML) INJECTION EVERY 12 HOURS SCHEDULED
Status: DISCONTINUED | OUTPATIENT
Start: 2019-01-01 | End: 2019-01-01 | Stop reason: HOSPADM

## 2019-01-01 RX ORDER — CASTOR OIL AND BALSAM, PERU 788; 87 MG/G; MG/G
OINTMENT TOPICAL 2 TIMES DAILY
Status: DISCONTINUED | OUTPATIENT
Start: 2019-01-01 | End: 2019-01-01 | Stop reason: HOSPADM

## 2019-01-01 RX ORDER — IPRATROPIUM BROMIDE AND ALBUTEROL SULFATE 2.5; .5 MG/3ML; MG/3ML
1 SOLUTION RESPIRATORY (INHALATION) EVERY 4 HOURS PRN
Status: DISCONTINUED | OUTPATIENT
Start: 2019-01-01 | End: 2019-01-01 | Stop reason: HOSPADM

## 2019-01-01 RX ORDER — ACETAMINOPHEN 325 MG/1
650 TABLET ORAL EVERY 4 HOURS PRN
COMMUNITY

## 2019-01-01 RX ORDER — BUDESONIDE 0.5 MG/2ML
500 INHALANT ORAL 2 TIMES DAILY
Status: DISCONTINUED | OUTPATIENT
Start: 2019-01-01 | End: 2019-01-01 | Stop reason: HOSPADM

## 2019-01-01 RX ORDER — PREDNISONE 20 MG/1
60 TABLET ORAL ONCE
Status: COMPLETED | OUTPATIENT
Start: 2019-01-01 | End: 2019-01-01

## 2019-01-01 RX ORDER — ALBUTEROL SULFATE 2.5 MG/3ML
2.5 SOLUTION RESPIRATORY (INHALATION) 3 TIMES DAILY
Status: DISCONTINUED | OUTPATIENT
Start: 2019-01-01 | End: 2019-01-01 | Stop reason: HOSPADM

## 2019-01-01 RX ORDER — GUAIFENESIN 600 MG/1
1200 TABLET, EXTENDED RELEASE ORAL 2 TIMES DAILY
Status: DISCONTINUED | OUTPATIENT
Start: 2019-01-01 | End: 2019-01-01 | Stop reason: HOSPADM

## 2019-01-01 RX ORDER — ONDANSETRON 2 MG/ML
4 INJECTION INTRAMUSCULAR; INTRAVENOUS EVERY 6 HOURS PRN
Status: DISCONTINUED | OUTPATIENT
Start: 2019-01-01 | End: 2019-01-01 | Stop reason: HOSPADM

## 2019-01-01 RX ORDER — FUROSEMIDE 10 MG/ML
20 INJECTION INTRAMUSCULAR; INTRAVENOUS ONCE
Status: COMPLETED | OUTPATIENT
Start: 2019-01-01 | End: 2019-01-01

## 2019-01-01 RX ORDER — ZINC OXIDE AND DIMETHICONE 120; 10 MG/G; MG/G
CREAM TOPICAL 2 TIMES DAILY
COMMUNITY

## 2019-01-01 RX ORDER — OSELTAMIVIR PHOSPHATE 30 MG/1
30 CAPSULE ORAL ONCE
Status: COMPLETED | OUTPATIENT
Start: 2019-01-01 | End: 2019-01-01

## 2019-01-01 RX ORDER — ALBUTEROL SULFATE 2.5 MG/3ML
2.5 SOLUTION RESPIRATORY (INHALATION) EVERY 6 HOURS PRN
Status: DISCONTINUED | OUTPATIENT
Start: 2019-01-01 | End: 2019-01-01

## 2019-01-01 RX ORDER — MORPHINE SULFATE 2 MG/ML
1 INJECTION, SOLUTION INTRAMUSCULAR; INTRAVENOUS ONCE
Status: DISCONTINUED | OUTPATIENT
Start: 2019-01-01 | End: 2019-01-01 | Stop reason: HOSPADM

## 2019-01-01 RX ORDER — METOPROLOL SUCCINATE 25 MG/1
25 TABLET, EXTENDED RELEASE ORAL DAILY
Status: DISCONTINUED | OUTPATIENT
Start: 2019-01-01 | End: 2019-01-01 | Stop reason: HOSPADM

## 2019-01-01 RX ORDER — ESCITALOPRAM OXALATE 5 MG/1
5 TABLET ORAL NIGHTLY
Qty: 30 TABLET | Refills: 3 | Status: SHIPPED | OUTPATIENT
Start: 2019-01-01 | End: 2019-01-01 | Stop reason: CLARIF

## 2019-01-01 RX ORDER — ONDANSETRON 2 MG/ML
4 INJECTION INTRAMUSCULAR; INTRAVENOUS EVERY 6 HOURS PRN
Status: DISCONTINUED | OUTPATIENT
Start: 2019-01-01 | End: 2019-01-01 | Stop reason: SDUPTHER

## 2019-01-01 RX ORDER — LABETALOL HYDROCHLORIDE 5 MG/ML
5 INJECTION, SOLUTION INTRAVENOUS ONCE
Status: DISCONTINUED | OUTPATIENT
Start: 2019-01-01 | End: 2019-01-01

## 2019-01-01 RX ORDER — ALBUTEROL SULFATE 2.5 MG/3ML
2.5 SOLUTION RESPIRATORY (INHALATION) EVERY 6 HOURS PRN
Status: CANCELLED | OUTPATIENT
Start: 2019-01-01

## 2019-01-01 RX ORDER — IPRATROPIUM BROMIDE AND ALBUTEROL SULFATE 2.5; .5 MG/3ML; MG/3ML
SOLUTION RESPIRATORY (INHALATION)
Status: COMPLETED
Start: 2019-01-01 | End: 2019-01-01

## 2019-01-01 RX ORDER — ESCITALOPRAM OXALATE 10 MG/1
5 TABLET ORAL NIGHTLY
Status: DISCONTINUED | OUTPATIENT
Start: 2019-01-01 | End: 2019-01-01 | Stop reason: HOSPADM

## 2019-01-01 RX ORDER — METOPROLOL SUCCINATE 25 MG/1
25 TABLET, EXTENDED RELEASE ORAL DAILY
Qty: 30 TABLET | Refills: 3 | Status: SHIPPED | OUTPATIENT
Start: 2019-01-01

## 2019-01-01 RX ORDER — IPRATROPIUM BROMIDE AND ALBUTEROL SULFATE 2.5; .5 MG/3ML; MG/3ML
1 SOLUTION RESPIRATORY (INHALATION)
Status: DISCONTINUED | OUTPATIENT
Start: 2019-01-01 | End: 2019-01-01

## 2019-01-01 RX ORDER — ONDANSETRON 2 MG/ML
4 INJECTION INTRAMUSCULAR; INTRAVENOUS EVERY 6 HOURS PRN
Status: CANCELLED | OUTPATIENT
Start: 2019-01-01

## 2019-01-01 RX ORDER — OSELTAMIVIR PHOSPHATE 75 MG/1
75 CAPSULE ORAL 2 TIMES DAILY
Status: DISCONTINUED | OUTPATIENT
Start: 2019-01-01 | End: 2019-01-01

## 2019-01-01 RX ORDER — LORAZEPAM 2 MG/ML
1 INJECTION INTRAMUSCULAR EVERY 4 HOURS PRN
Status: DISCONTINUED | OUTPATIENT
Start: 2019-01-01 | End: 2019-01-01 | Stop reason: HOSPADM

## 2019-01-01 RX ORDER — ALBUTEROL SULFATE 2.5 MG/3ML
2.5 SOLUTION RESPIRATORY (INHALATION) EVERY 6 HOURS PRN
COMMUNITY

## 2019-01-01 RX ORDER — NYSTATIN 100000 U/G
CREAM TOPICAL
Qty: 1 TUBE | Refills: 0 | Status: SHIPPED | OUTPATIENT
Start: 2019-01-01 | End: 2019-01-01 | Stop reason: CLARIF

## 2019-01-01 RX ORDER — OSELTAMIVIR PHOSPHATE 30 MG/1
30 CAPSULE ORAL 2 TIMES DAILY
Status: COMPLETED | OUTPATIENT
Start: 2019-01-01 | End: 2019-01-01

## 2019-01-01 RX ORDER — AMOXICILLIN AND CLAVULANATE POTASSIUM 875; 125 MG/1; MG/1
1 TABLET, FILM COATED ORAL EVERY 12 HOURS SCHEDULED
Status: DISCONTINUED | OUTPATIENT
Start: 2019-01-01 | End: 2019-01-01 | Stop reason: HOSPADM

## 2019-01-01 RX ADMIN — AZITHROMYCIN DIHYDRATE 500 MG: 500 INJECTION, POWDER, LYOPHILIZED, FOR SOLUTION INTRAVENOUS at 18:47

## 2019-01-01 RX ADMIN — NYSTATIN: 100000 CREAM TOPICAL at 10:08

## 2019-01-01 RX ADMIN — METOPROLOL SUCCINATE 25 MG: 25 TABLET, EXTENDED RELEASE ORAL at 18:47

## 2019-01-01 RX ADMIN — NYSTATIN: 100000 CREAM TOPICAL at 20:46

## 2019-01-01 RX ADMIN — OSELTAMIVIR PHOSPHATE 30 MG: 30 CAPSULE ORAL at 08:20

## 2019-01-01 RX ADMIN — ENOXAPARIN SODIUM 40 MG: 40 INJECTION SUBCUTANEOUS at 10:30

## 2019-01-01 RX ADMIN — FUROSEMIDE 20 MG: 10 INJECTION, SOLUTION INTRAMUSCULAR; INTRAVENOUS at 20:09

## 2019-01-01 RX ADMIN — ESCITALOPRAM OXALATE 5 MG: 10 TABLET, FILM COATED ORAL at 20:54

## 2019-01-01 RX ADMIN — AMOXICILLIN AND CLAVULANATE POTASSIUM 1 TABLET: 875; 125 TABLET, FILM COATED ORAL at 09:11

## 2019-01-01 RX ADMIN — NYSTATIN: 100000 CREAM TOPICAL at 21:04

## 2019-01-01 RX ADMIN — ALBUTEROL SULFATE 2.5 MG: 2.5 SOLUTION RESPIRATORY (INHALATION) at 16:45

## 2019-01-01 RX ADMIN — Medication 10 ML: at 10:32

## 2019-01-01 RX ADMIN — Medication 10 ML: at 08:21

## 2019-01-01 RX ADMIN — GUAIFENESIN 1200 MG: 600 TABLET, EXTENDED RELEASE ORAL at 10:29

## 2019-01-01 RX ADMIN — PREDNISONE 40 MG: 20 TABLET ORAL at 08:43

## 2019-01-01 RX ADMIN — NYSTATIN: 100000 CREAM TOPICAL at 21:49

## 2019-01-01 RX ADMIN — IPRATROPIUM BROMIDE AND ALBUTEROL SULFATE 3 ML: .5; 3 SOLUTION RESPIRATORY (INHALATION) at 19:49

## 2019-01-01 RX ADMIN — Medication 10 ML: at 21:47

## 2019-01-01 RX ADMIN — ALBUTEROL SULFATE 2.5 MG: 2.5 SOLUTION RESPIRATORY (INHALATION) at 06:48

## 2019-01-01 RX ADMIN — LABETALOL 20 MG/4 ML (5 MG/ML) INTRAVENOUS SYRINGE 5 MG: at 17:40

## 2019-01-01 RX ADMIN — GUAIFENESIN 1200 MG: 600 TABLET, EXTENDED RELEASE ORAL at 09:11

## 2019-01-01 RX ADMIN — GUAIFENESIN 1200 MG: 600 TABLET, EXTENDED RELEASE ORAL at 19:50

## 2019-01-01 RX ADMIN — ALBUTEROL SULFATE 2.5 MG: 2.5 SOLUTION RESPIRATORY (INHALATION) at 17:07

## 2019-01-01 RX ADMIN — Medication 10 ML: at 20:54

## 2019-01-01 RX ADMIN — ENOXAPARIN SODIUM 40 MG: 40 INJECTION SUBCUTANEOUS at 08:20

## 2019-01-01 RX ADMIN — IPRATROPIUM BROMIDE AND ALBUTEROL SULFATE 1 AMPULE: .5; 3 SOLUTION RESPIRATORY (INHALATION) at 11:37

## 2019-01-01 RX ADMIN — ALBUTEROL SULFATE 2.5 MG: 2.5 SOLUTION RESPIRATORY (INHALATION) at 21:26

## 2019-01-01 RX ADMIN — Medication 10 ML: at 19:52

## 2019-01-01 RX ADMIN — OSELTAMIVIR PHOSPHATE 30 MG: 30 CAPSULE ORAL at 10:30

## 2019-01-01 RX ADMIN — PREDNISONE 40 MG: 20 TABLET ORAL at 08:20

## 2019-01-01 RX ADMIN — AMOXICILLIN AND CLAVULANATE POTASSIUM 1 TABLET: 875; 125 TABLET, FILM COATED ORAL at 19:50

## 2019-01-01 RX ADMIN — SODIUM CHLORIDE 1539 ML: 9 INJECTION, SOLUTION INTRAVENOUS at 16:57

## 2019-01-01 RX ADMIN — AMOXICILLIN AND CLAVULANATE POTASSIUM 1 TABLET: 875; 125 TABLET, FILM COATED ORAL at 20:53

## 2019-01-01 RX ADMIN — IPRATROPIUM BROMIDE AND ALBUTEROL SULFATE 1 AMPULE: .5; 3 SOLUTION RESPIRATORY (INHALATION) at 16:12

## 2019-01-01 RX ADMIN — Medication 10 ML: at 21:03

## 2019-01-01 RX ADMIN — SODIUM CHLORIDE 1428 ML: 9 INJECTION, SOLUTION INTRAVENOUS at 12:00

## 2019-01-01 RX ADMIN — GUAIFENESIN 1200 MG: 600 TABLET, EXTENDED RELEASE ORAL at 20:53

## 2019-01-01 RX ADMIN — OSELTAMIVIR PHOSPHATE 30 MG: 30 CAPSULE ORAL at 21:01

## 2019-01-01 RX ADMIN — ESCITALOPRAM OXALATE 5 MG: 10 TABLET, FILM COATED ORAL at 19:51

## 2019-01-01 RX ADMIN — MORPHINE SULFATE 4 MG: 4 INJECTION INTRAVENOUS at 07:21

## 2019-01-01 RX ADMIN — NYSTATIN: 100000 CREAM TOPICAL at 08:22

## 2019-01-01 RX ADMIN — ALBUTEROL SULFATE 2.5 MG: 2.5 SOLUTION RESPIRATORY (INHALATION) at 09:29

## 2019-01-01 RX ADMIN — ALBUTEROL SULFATE 2.5 MG: 2.5 SOLUTION RESPIRATORY (INHALATION) at 20:32

## 2019-01-01 RX ADMIN — GUAIFENESIN 1200 MG: 600 TABLET, EXTENDED RELEASE ORAL at 08:20

## 2019-01-01 RX ADMIN — BUDESONIDE 500 MCG: 0.5 SUSPENSION RESPIRATORY (INHALATION) at 08:55

## 2019-01-01 RX ADMIN — AMOXICILLIN AND CLAVULANATE POTASSIUM 1 TABLET: 875; 125 TABLET, FILM COATED ORAL at 08:20

## 2019-01-01 RX ADMIN — BUDESONIDE 500 MCG: 0.5 SUSPENSION RESPIRATORY (INHALATION) at 09:29

## 2019-01-01 RX ADMIN — NYSTATIN: 100000 CREAM TOPICAL at 20:55

## 2019-01-01 RX ADMIN — ENOXAPARIN SODIUM 40 MG: 40 INJECTION SUBCUTANEOUS at 08:43

## 2019-01-01 RX ADMIN — TETANUS TOXOID, REDUCED DIPHTHERIA TOXOID AND ACELLULAR PERTUSSIS VACCINE, ADSORBED 0.5 ML: 5; 2.5; 8; 8; 2.5 SUSPENSION INTRAMUSCULAR at 03:25

## 2019-01-01 RX ADMIN — NYSTATIN: 100000 CREAM TOPICAL at 10:31

## 2019-01-01 RX ADMIN — ENOXAPARIN SODIUM 40 MG: 40 INJECTION SUBCUTANEOUS at 09:11

## 2019-01-01 RX ADMIN — CEFTRIAXONE 1 G: 1 INJECTION, POWDER, FOR SOLUTION INTRAMUSCULAR; INTRAVENOUS at 17:11

## 2019-01-01 RX ADMIN — METOPROLOL SUCCINATE 25 MG: 25 TABLET, EXTENDED RELEASE ORAL at 09:33

## 2019-01-01 RX ADMIN — NYSTATIN: 100000 CREAM TOPICAL at 09:35

## 2019-01-01 RX ADMIN — ALBUTEROL SULFATE 2.5 MG: 2.5 SOLUTION RESPIRATORY (INHALATION) at 08:52

## 2019-01-01 RX ADMIN — BUDESONIDE 500 MCG: 0.5 SUSPENSION RESPIRATORY (INHALATION) at 21:27

## 2019-01-01 RX ADMIN — LORAZEPAM 1 MG: 2 INJECTION INTRAMUSCULAR; INTRAVENOUS at 20:39

## 2019-01-01 RX ADMIN — OSELTAMIVIR PHOSPHATE 30 MG: 30 CAPSULE ORAL at 10:08

## 2019-01-01 RX ADMIN — GUAIFENESIN 1200 MG: 600 TABLET, EXTENDED RELEASE ORAL at 20:47

## 2019-01-01 RX ADMIN — Medication 10 ML: at 21:30

## 2019-01-01 RX ADMIN — BUDESONIDE 500 MCG: 0.5 SUSPENSION RESPIRATORY (INHALATION) at 20:33

## 2019-01-01 RX ADMIN — BUDESONIDE 500 MCG: 0.5 SUSPENSION RESPIRATORY (INHALATION) at 21:05

## 2019-01-01 RX ADMIN — PREDNISONE 40 MG: 20 TABLET ORAL at 10:07

## 2019-01-01 RX ADMIN — GUAIFENESIN 1200 MG: 600 TABLET, EXTENDED RELEASE ORAL at 21:47

## 2019-01-01 RX ADMIN — ALBUTEROL SULFATE 2.5 MG: 2.5 SOLUTION RESPIRATORY (INHALATION) at 02:52

## 2019-01-01 RX ADMIN — OSELTAMIVIR PHOSPHATE 30 MG: 30 CAPSULE ORAL at 21:09

## 2019-01-01 RX ADMIN — BUDESONIDE 500 MCG: 0.5 SUSPENSION RESPIRATORY (INHALATION) at 20:19

## 2019-01-01 RX ADMIN — Medication 10 ML: at 09:00

## 2019-01-01 RX ADMIN — PREDNISONE 60 MG: 20 TABLET ORAL at 12:01

## 2019-01-01 RX ADMIN — ESCITALOPRAM OXALATE 5 MG: 10 TABLET, FILM COATED ORAL at 20:47

## 2019-01-01 RX ADMIN — OSELTAMIVIR PHOSPHATE 30 MG: 30 CAPSULE ORAL at 14:39

## 2019-01-01 RX ADMIN — Medication 10 ML: at 10:09

## 2019-01-01 RX ADMIN — ALBUTEROL SULFATE 2.5 MG: 2.5 SOLUTION RESPIRATORY (INHALATION) at 21:05

## 2019-01-01 RX ADMIN — PREDNISONE 40 MG: 20 TABLET ORAL at 09:10

## 2019-01-01 RX ADMIN — OSELTAMIVIR PHOSPHATE 30 MG: 30 CAPSULE ORAL at 19:50

## 2019-01-01 RX ADMIN — ALBUTEROL SULFATE 2.5 MG: 2.5 SOLUTION RESPIRATORY (INHALATION) at 16:14

## 2019-01-01 RX ADMIN — ALBUTEROL SULFATE 2.5 MG: 2.5 SOLUTION RESPIRATORY (INHALATION) at 10:54

## 2019-01-01 RX ADMIN — MORPHINE SULFATE 4 MG: 4 INJECTION INTRAVENOUS at 10:15

## 2019-01-01 RX ADMIN — ENOXAPARIN SODIUM 40 MG: 40 INJECTION SUBCUTANEOUS at 10:07

## 2019-01-01 RX ADMIN — OSELTAMIVIR PHOSPHATE 30 MG: 30 CAPSULE ORAL at 20:48

## 2019-01-01 RX ADMIN — GUAIFENESIN 1200 MG: 600 TABLET, EXTENDED RELEASE ORAL at 08:43

## 2019-01-01 RX ADMIN — MAGNESIUM SULFATE HEPTAHYDRATE 1 G: 1 INJECTION, SOLUTION INTRAVENOUS at 14:08

## 2019-01-01 RX ADMIN — BUDESONIDE 500 MCG: 0.5 SUSPENSION RESPIRATORY (INHALATION) at 06:55

## 2019-01-01 RX ADMIN — MORPHINE SULFATE 2 MG: 2 INJECTION, SOLUTION INTRAMUSCULAR; INTRAVENOUS at 01:10

## 2019-01-01 RX ADMIN — LORAZEPAM 1 MG: 2 INJECTION INTRAMUSCULAR; INTRAVENOUS at 00:54

## 2019-01-01 RX ADMIN — GUAIFENESIN 1200 MG: 600 TABLET, EXTENDED RELEASE ORAL at 10:07

## 2019-01-01 RX ADMIN — NYSTATIN: 100000 CREAM TOPICAL at 19:51

## 2019-01-01 RX ADMIN — BUDESONIDE 500 MCG: 0.5 SUSPENSION RESPIRATORY (INHALATION) at 08:37

## 2019-01-01 RX ADMIN — OSELTAMIVIR PHOSPHATE 30 MG: 30 CAPSULE ORAL at 08:44

## 2019-01-01 RX ADMIN — Medication 10 ML: at 08:45

## 2019-01-01 RX ADMIN — PREDNISONE 40 MG: 20 TABLET ORAL at 10:30

## 2019-01-01 RX ADMIN — NYSTATIN: 100000 CREAM TOPICAL at 08:43

## 2019-01-01 RX ADMIN — ENOXAPARIN SODIUM 40 MG: 40 INJECTION SUBCUTANEOUS at 18:44

## 2019-01-01 RX ADMIN — Medication 10 ML: at 09:11

## 2019-01-01 RX ADMIN — ESCITALOPRAM OXALATE 5 MG: 10 TABLET, FILM COATED ORAL at 21:47

## 2019-01-01 RX ADMIN — ALBUTEROL SULFATE 2.5 MG: 2.5 SOLUTION RESPIRATORY (INHALATION) at 08:59

## 2019-01-01 RX ADMIN — OSELTAMIVIR PHOSPHATE 30 MG: 30 CAPSULE ORAL at 09:34

## 2019-01-01 RX ADMIN — LORAZEPAM 1 MG: 2 INJECTION INTRAMUSCULAR; INTRAVENOUS at 07:21

## 2019-01-01 RX ADMIN — Medication 10 ML: at 20:48

## 2019-01-01 RX ADMIN — AMOXICILLIN AND CLAVULANATE POTASSIUM 1 TABLET: 875; 125 TABLET, FILM COATED ORAL at 08:43

## 2019-01-01 RX ADMIN — AMOXICILLIN AND CLAVULANATE POTASSIUM 1 TABLET: 875; 125 TABLET, FILM COATED ORAL at 21:01

## 2019-01-01 RX ADMIN — BUDESONIDE 500 MCG: 0.5 SUSPENSION RESPIRATORY (INHALATION) at 21:25

## 2019-01-01 RX ADMIN — ALBUTEROL SULFATE 2.5 MG: 2.5 SOLUTION RESPIRATORY (INHALATION) at 05:05

## 2019-01-01 RX ADMIN — LORAZEPAM 1 MG: 2 INJECTION INTRAMUSCULAR; INTRAVENOUS at 10:37

## 2019-01-01 RX ADMIN — METOPROLOL SUCCINATE 25 MG: 25 TABLET, EXTENDED RELEASE ORAL at 08:43

## 2019-01-01 RX ADMIN — METHYLPREDNISOLONE SODIUM SUCCINATE 125 MG: 125 INJECTION, POWDER, FOR SOLUTION INTRAMUSCULAR; INTRAVENOUS at 16:54

## 2019-01-01 RX ADMIN — BUDESONIDE 500 MCG: 0.5 SUSPENSION RESPIRATORY (INHALATION) at 08:52

## 2019-01-01 ASSESSMENT — PATIENT HEALTH QUESTIONNAIRE - PHQ9
2. FEELING DOWN, DEPRESSED OR HOPELESS: 0
SUM OF ALL RESPONSES TO PHQ QUESTIONS 1-9: 0
SUM OF ALL RESPONSES TO PHQ9 QUESTIONS 1 & 2: 0
1. LITTLE INTEREST OR PLEASURE IN DOING THINGS: 0
SUM OF ALL RESPONSES TO PHQ QUESTIONS 1-9: 0

## 2019-01-01 ASSESSMENT — PAIN SCALES - GENERAL
PAINLEVEL_OUTOF10: 0
PAINLEVEL_OUTOF10: 5
PAINLEVEL_OUTOF10: 0
PAINLEVEL_OUTOF10: 4
PAINLEVEL_OUTOF10: 0
PAINLEVEL_OUTOF10: 5
PAINLEVEL_OUTOF10: 0
PAINLEVEL_OUTOF10: 5
PAINLEVEL_OUTOF10: 0

## 2019-01-01 ASSESSMENT — PAIN DESCRIPTION - LOCATION: LOCATION: WRIST

## 2019-01-01 ASSESSMENT — PAIN DESCRIPTION - ORIENTATION: ORIENTATION: LEFT

## 2019-01-01 ASSESSMENT — ENCOUNTER SYMPTOMS
ABDOMINAL PAIN: 0
BACK PAIN: 1

## 2019-01-01 ASSESSMENT — PAIN DESCRIPTION - PAIN TYPE: TYPE: ACUTE PAIN

## 2019-02-06 PROBLEM — R60.0 LOCALIZED EDEMA: Status: RESOLVED | Noted: 2017-12-19 | Resolved: 2019-01-01

## 2019-02-06 PROBLEM — J96.01 ACUTE RESPIRATORY FAILURE WITH HYPOXIA (HCC): Status: RESOLVED | Noted: 2018-02-21 | Resolved: 2019-01-01

## 2019-02-06 PROBLEM — R06.02 SHORTNESS OF BREATH: Status: RESOLVED | Noted: 2018-02-19 | Resolved: 2019-01-01

## 2019-02-06 PROBLEM — B33.8 RSV (RESPIRATORY SYNCYTIAL VIRUS INFECTION): Status: RESOLVED | Noted: 2018-02-21 | Resolved: 2019-01-01

## 2019-03-20 PROBLEM — J11.1 INFLUENZA: Status: ACTIVE | Noted: 2019-01-01

## 2019-03-20 NOTE — ED NOTES
Report given to Salo Medley RN. Pt and pt's son aware of room assignment. Pt in no signs of distress.       Demetrius Torres RN  03/20/19 8747

## 2019-03-20 NOTE — ED PROVIDER NOTES
ED Attending Attestation Note     Date of evaluation: 3/20/2019    This patient was seen by the advance practice provider. I have seen and examined the patient, agree with the workup, evaluation, management and diagnosis. The care plan has been discussed. I have reviewed the ECG and concur with the ISATU's interpretation. My assessment reveals patient here with decline in MS and hypoxia. Found to have influenza and will require admission. PCP and residents contacted.      Nighat Solano MD  03/20/19 0636

## 2019-03-20 NOTE — PROGRESS NOTES
RESPIRATORY THERAPY ASSESSMENT    [facility] Record Number:  [id number]  Age: 80 y.o. Gender: female  : 1927  Today's Date:  3/20/2019  Room:  [address] [place] [address]    Assessment     Is the patient being admitted for a COPD or Asthma exacerbation? No   (If yes the patient will be seen every 4 hours for the first 24 hours and then reassessed)    Patient Admission Diagnosis      Allergies  Allergies   Allergen Reactions    Vancomycin Hives       Minimum Predicted Vital Capacity:     640          Actual Vital Capacity:      500              Pulmonary History: emphysema, s/p right partial pneumonectomy   Home Oxygen Therapy: none  Home Respiratory Therapy: Albuterol Q6H PRN, Pulmicort BID   Current Respiratory Therapy:  HHN Q6H PRN albuterol, Pulmicort BID  Treatment Type: Aerosol Generator  Medications: Albuterol/Ipratropium    Respiratory Severity Index(RSI)   Patients with orders for inhalation medications, oxygen, or any therapeutic treatment modality will be placed on Respiratory Protocol. They will be assessed with the first treatment and at least every 72 hours thereafter. The following severity scale will be used to determine frequency of treatment intervention. Smoking History: Pulmonary Disease or Smoking History, Greater than 15 pack year = 2    Social History  Social History     Tobacco Use    Smoking status: Former Smoker     Years: 10.00     Types: Cigarettes     Last attempt to quit: 1990     Years since quittin.1    Smokeless tobacco: Never Used   Substance Use Topics    Alcohol use:  Yes     Alcohol/week: 0.6 oz     Types: 1 Glasses of wine per week     Comment: half glass nightly    Drug use: No       Recent Surgical History: None = 0  Past Surgical History  Past Surgical History:   Procedure Laterality Date    APPENDECTOMY      LUNG REMOVAL, PARTIAL         Level of Consciousness: Alert, Follows Commands but Disoriented = 1    Level of Activity: Walking with assistance = 1    Respiratory Pattern: Regular Pattern; RR 8-20 = 0    Breath Sounds: Diminished unilaterally = 1    Sputum   ,  , Sputum How Obtained: Spontaneous cough  Cough: Strong, spontaneous, non-productive = 0    Vital Signs   BP (!) 149/70   Pulse 87   Temp 99.4 °F (37.4 °C) (Oral)   Resp 28   Ht 4' 11\" (1.499 m)   Wt 110 lb 14.3 oz (50.3 kg)   SpO2 97%   BMI 22.40 kg/m²   SPO2 (COPD values may differ): Greater than or equal to 92% on room air = 0    Peak Flow (asthma only): not applicable = 0    RSI: 5-6 = Q4hr PRN (every four hours as needed) for dyspnea        Plan       Goals: medication delivery, mobilize retained secretions, volume expansion and improve oxygenation    Patient/caregiver was educated on the proper method of use for Respiratory Care Devices:  Yes      Level of patient/caregiver understanding able to:   ? Verbalize understanding   ? Demonstrate understanding       ? Teach back        ? Needs reinforcement       ? No available caregiver               ? Other:     Response to education:  Huangemery Vázquez     Is patient being placed on Home Treatment Regimen? Yes     Does the patient have everything they need prior to discharge? NA     Comments: Patient admitted + flu. She appears in no distress at this time. Chart reviewed. Plan of Care: Continue HHN BID pulmicort, Albuterol Q6H PRN    Electronically signed by Jesi De La Rosa RCP on 3/20/2019 at 6:21 PM    Respiratory Protocol Guidelines     1. Assessment and treatment by Respiratory Therapy will be initiated for medication and therapeutic interventions upon initiation of aerosolized medication. 2. Physician will be contacted for respiratory rate (RR) greater than 35 breaths per minute. Therapy will be held for heart rate (HR) greater than 140 beats per minute, pending direction from physician. 3. Bronchodilators will be administered via Metered Dose Inhaler (MDI) with spacer when the following criteria are met:  a.  Alert and cooperative     b. HR < 140 bpm  c. RR < 30 bpm                d. Can demonstrate a 2-3 second inspiratory hold  4. Bronchodilators will be administered via Hand Held Nebulizer [facility]) to patients when ANY of the following criteria are met  a. Incognizant or uncooperative          b. Patients treated with HHN at Home        c. Unable to demonstrate proper use of MDI with spacer     d. RR > 30 bpm   5. Bronchodilators will be delivered via Metered Dose Inhaler (MDI), HHN, Aerogen to intubated patients on mechanical ventilation. 6. Inhalation medication orders will be delivered and/or substituted as outlined below. Aerosolized Medications Ordering and Administration Guidelines:    1. All Medications will be ordered by a physician, and their frequency and/or modality will be adjusted as defined by the patients Respiratory Severity Index (RSI) score. 2. If the patient does not have documented COPD, consider discontinuing anticholinergics when RSI is less than 9.  3. If the bronchospasm worsens (increased RSI), then the bronchodilator frequency can be increased to a maximum of every 4 hours. If greater than every 4 hours is required, the physician will be contacted. 4. If the bronchospasm improves, the frequency of the bronchodilator can be decreased, based on the patient's RSI, but not less than home treatment regimen frequency. 5. Bronchodilator(s) will be discontinued if patient has a RSI less than 9 and has received no scheduled or as needed treatment for 72  Hrs. Patients Ordered on a Mucolytic Agent:    1. Must always be administered with a bronchodilator. 2. Discontinue if patient experiences worsened bronchospasm, or secretions have lessened to the point that the patient is able to clear them with a cough. Anti-inflammatory and Combination Medications:    1.  If the patient lacks prior history of lung disease, is not using inhaled anti-inflammatory medication at home, and lacks wheezing by examination or by history for at least 24 hours, contact physician for possible discontinuation.

## 2019-03-20 NOTE — ED NOTES
[address] [facility]          PHYSICIAN ASSISTANT NOTE       Date of evaluation: 3/20/2019    Chief Complaint     Altered Mental Status      History of Present Illness     Kenny Dawkins is a 80 y.o. female with a past medical history of emphysema and partial right pneumonectomy who presents for altered mental status. Patient arrives to the ER from independent living via EMS. Son is present with patient. Per son, the patient has been declining over the past week. Patient did fall once last week, unknown if she hit her head or loss consciousness. Since Sunday, the patient has seemed more confused than usual.  Son reports the nurse's aid reports the patient has been running into walls with her walker and \"speaking gibberish\" at times. Last known normal was at least Sunday, today is Wednesday. Patient also with apparent shortness of breath today, son reports this is increased from her baseline. Patient is not on oxygen at baseline. Son also reports the nurses aides were concerned for UTI due to an odor. Patient denies any complaints, history limited from patient. Review of Systems     Review of Systems   Unable to perform ROS: Dementia       Past Medical, Surgical, Family, and Social History     She has a past medical history of Basal cell carcinoma, Cancer ([address] Localized edema, and Other emphysema ([facility] [address] She has a past surgical history that includes Appendectomy and Lung removal, partial.  Her family history is not on file. She reports that she quit smoking about 29 years ago. Her smoking use included cigarettes. She quit after 10.00 years of use. She has never used smokeless tobacco. She reports that she drinks about 0.6 oz of alcohol per week. She reports that she does not use drugs.     Medications     Previous Medications    ACETAMINOPHEN (TYLENOL) 500 MG TABLET    Take 1 tablet by mouth 4 times daily as needed for Pain    ALBUTEROL (PROVENTIL) (2.5 MG/3ML) 0.083% NEBULIZER SOLUTION    Take 3 mLs by nebulization every 6 hours as needed for Wheezing    BUDESONIDE (PULMICORT) 0.5 MG/2ML NEBULIZER SUSPENSION    Take 2 mLs by nebulization 2 times daily    ESCITALOPRAM (LEXAPRO) 5 MG TABLET    Take 1 tablet by mouth nightly    GUAIFENESIN (MUCINEX) 600 MG EXTENDED RELEASE TABLET    Take 1,200 mg by mouth 2 times daily    HYDROCORTISONE 2.5 % CREAM    Apply topically 2 times daily. METRONIDAZOLE (METROCREAM) 0.75 % CREAM    Apply to face BID       Allergies     She is allergic to vancomycin. Physical Exam     INITIAL VITALS: BP: (!) 179/78,  , Pulse: 103, Resp: (!) 40, SpO2: (!) 70 %  Physical Exam   Constitutional:   Tachypneic, but alert and following commands. HENT:   Head: Normocephalic and atraumatic. Neck: Normal range of motion. Neck supple. Cardiovascular:   Tachycardic, irregular rhythm   Pulmonary/Chest:   Tachypneic, diminished breath sounds with wheezing throughout. Abdominal:   Large ventral hernia, no tenderness. Superficial ulceration to lower pannus. No fluctuance or drainage. Neurological: She is alert. Oriented to person and place. No facial droop or dysarthria. Symmetric facial movements. Negative pronator drift. Moving all extremities. Skin: Skin is warm and dry. Psychiatric: She has a normal mood and affect. Her behavior is normal. Judgment and thought content normal.   Nursing note and vitals reviewed.       Diagnostic Results     EKG   Interpreted in conjunction with emergency department physician Theresa Pollack MD  Rhythm: atrial flutter  Rate: normal  Axis: normal  Ectopy: premature ventricular contractions (unifocal)  Conduction: right bundle branch block (complete)  ST Segments: no acute change  T Waves: no acute change  Q Waves:nonspecific  Clinical Impression: non-specific EKG  Comparison:  Similar to 2/26/19    RADIOLOGY:  CT Head WO Contrast   Final Result      No acute hemorrhage      XR CHEST STANDARD (2 VW)   Final Result Possible airspace disease in the left lung base.  The exam is limited due to patient positioning      Demineralization and moderate to severe dextrocurvature of the thoracic spine                LABS:   Results for orders placed or performed during the hospital encounter of 03/20/19   Rapid influenza A/B antigens   Result Value Ref Range    Rapid Influenza A Ag POSITIVE (A) Negative    Rapid Influenza B Ag Negative Negative   CBC Auto Differential   Result Value Ref Range    WBC 15.6 (H) 4.0 - 11.0 K/uL    RBC 4.48 4.00 - 5.20 M/uL    Hemoglobin 13.8 12.0 - 16.0 g/dL    Hematocrit 41.1 36.0 - 48.0 %    MCV 91.7 80.0 - 100.0 fL    MCH 30.7 26.0 - 34.0 pg    MCHC 33.5 31.0 - 36.0 g/dL    RDW 14.8 12.4 - 15.4 %    Platelets 222 518 - 486 K/uL    MPV 8.8 5.0 - 10.5 fL    Neutrophils % 91.5 %    Lymphocytes % 1.4 %    Monocytes % 6.7 %    Eosinophils % 0.0 %    Basophils % 0.4 %    Neutrophils # 14.3 (H) 1.7 - 7.7 K/uL    Lymphocytes # 0.2 (L) 1.0 - 5.1 K/uL    Monocytes # 1.0 0.0 - 1.3 K/uL    Eosinophils # 0.0 0.0 - 0.6 K/uL    Basophils # 0.1 0.0 - 0.2 K/uL   Basic Metabolic Panel w/ Reflex to MG   Result Value Ref Range    Sodium 127 (L) 136 - 145 mmol/L    Potassium reflex Magnesium 3.8 3.5 - 5.1 mmol/L    Chloride 87 (L) 99 - 110 mmol/L    CO2 30 21 - 32 mmol/L    Anion Gap 10 3 - 16    Glucose 215 (H) 70 - 99 mg/dL    BUN 18 7 - 20 mg/dL    CREATININE <0.5 (L) 0.6 - 1.2 mg/dL    GFR Non-African American >60 >60    GFR African American >60 >60    Calcium 9.1 8.3 - 10.6 mg/dL   Troponin   Result Value Ref Range    Troponin <0.01 <0.01 ng/mL   Brain Natriuretic Peptide   Result Value Ref Range    Pro-BNP 25,224 (H) 0 - 449 pg/mL   Urinalysis, reflex to microscopic   Result Value Ref Range    Color, UA Yellow Straw/Yellow    Clarity, UA Clear Clear    Glucose, Ur Negative Negative mg/dL    Bilirubin Urine SMALL (A) Negative    Ketones, Urine TRACE (A) Negative mg/dL    Specific Gravity, UA >=1.030 1.005 - 1.030 Blood, Urine TRACE-LYSED (A) Negative    pH, UA 6.0 5.0 - 8.0    Protein, UA >=300 (A) Negative mg/dL    Urobilinogen, Urine 0.2 <2.0 E.U./dL    Nitrite, Urine Negative Negative    Leukocyte Esterase, Urine Negative Negative    Microscopic Examination YES     Urine Type Clean catch    Microscopic Urinalysis   Result Value Ref Range    WBC, UA 0-2 0 - 5 /HPF    RBC, UA 0-2 0 - 2 /HPF    Epi Cells 0-2 /HPF    Bacteria, UA 2+ (A) /HPF    Amorphous, UA 3+ (A) /HPF   POCT Venous   Result Value Ref Range    pH, Michael 7.331 (L) 7.350 - 7.450    pCO2, Michael 59.3 (H) 40.0 - 50.0 mm Hg    pO2, Michael 36 Not Established mm Hg    HCO3, Venous 31.3 (H) 23.0 - 29.0 mmol/L    Base Excess, Michael 5 (H) -3 - 3    O2 Sat, Michael 63 Not Established %    TC02 (Calc), Michael 33 Not Established mmol/L    Lactate 1.58 0.40 - 2.00 mmol/L    Sample Type MICHAEL     Performed on SEE BELOW    EKG 12 Lead   Result Value Ref Range    Ventricular Rate 98 BPM    Atrial Rate 188 BPM    QRS Duration 106 ms    Q-T Interval 368 ms    QTc Calculation (Bazett) 469 ms    P Axis 6 degrees    R Axis -28 degrees    T Axis 40 degrees    Diagnosis       EKG performed in ER and to be interpreted by ER physician. Confirmed by MD, ER ([id number]),  Armani Khanna (HEYDI), JESUS ([id number]) on 3/20/2019 1:02:44 PM       ED BEDSIDE ULTRASOUND:      RECENT VITALS:  BP: 136/64,  , Pulse: 81, Resp: 17, SpO2: 99 %     Procedures         ED Course     Nursing Notes, Past Medical Hx,Past Surgical Hx, Social Hx, Allergies, and Family Hx were reviewed.     The patient was given the following medications:  Orders Placed This Encounter   Medications    ipratropium-albuterol (DUONEB) nebulizer solution 1 ampule    predniSONE (DELTASONE) tablet 60 mg    0.9 % sodium chloride bolus    DISCONTD: oseltamivir (TAMIFLU) capsule 75 mg    oseltamivir (TAMIFLU) capsule 30 mg       CONSULTS:  IP CONSULT TO PRIMARY CARE PROVIDER    MEDICAL DECISION MAKING / ASSESSMENT / Lewis HEIN Barb Prado is a 80 y.o. female with altered mental status. Patient is alert on arrival with a non-focal neurologic exam. EKG is a-flutter, no changes compared to prior. Patient requiring supplemental oxygen by nasal cannula. Breath sounds diminished. Duoneb and prednisone ordered. Fluids initiated. Blood cultures x 2. VBG with pH of 7.33 and CO2 of 59. Lactate normal. Influenza A is positive, pharmacy recommends lower Tamiflu dose based on age - ordered. Chest x-ray without pneumonia. Ct head without acute process. urinalysis without infection. Troponin negative. Patient will be admitted for altered mental status, influenza, hypoxia. Patient accepted by Dr. Binta Horner and PILI. This patient was also evaluated by the attending physician. All care plans were discussed and agreed upon. Clinical Impression     1. Altered mental status, unspecified altered mental status type    2. Flu    3. Hypoxia        Disposition     PATIENT REFERRED TO:  No follow-up provider specified.     DISCHARGE MEDICATIONS:  New Prescriptions    No medications on file       DISPOSITION  admit     Yolanda Guadalupe PA-C  03/20/19 [id number]

## 2019-03-20 NOTE — H&P
0. 2   BILIRUBINUR SMALL*   BLOODU TRACE-LYSED*   GLUCOSEU Negative   AMORPHOUS 3+*       CT Head WO Contrast   Final Result      No acute hemorrhage      XR CHEST STANDARD (2 VW)   Final Result      Possible airspace disease in the left lung base. The exam is limited due to patient positioning      Demineralization and moderate to severe dextrocurvature of the thoracic spine                    ASSESSMENT AND PLAN:  Mrs Jessika Thornton is a 81 y/o female with PMH of lung cancer s/p partial right lobectomy, emphysema, and dementia who presents to the ED with acute respiratory failure and AMS      # Acute hypoxic hypercarbic respiratory failure   Most likely 2/2 to URI (influenza A +ve, pt living at independent living facility) with COPD exacerbation (patient has emphysema with extensive hx of smoking). This is probably worsened by prior right lung partial lobectomy in addition to kyphosis. Patient also has a mild leukocytosis, but afebrile.  Pro-BNP elevated but likely due to atrial stretching   - Start Tamiflu 75 Bid   -Prednisone 40 mg   - Continue home breathing treatments  - f/u UA, Urine Cx, Blood cxs, strep and legionella urine Ag  -Droplet precaution       # AMS 2/2 to acute hypoxic hypercarbic respiratory failure   -Hx of dementia, Alert, Oriented times *2 base line, Ct head is negative, Urine Clear   -moitoring  -Tamiflu   -Neuro check  -SLP eval      Hx of depression:  Continue Home SSRI    Will discuss with attending physician    Code Status: DNR CCA  FEN: General diet  PPX: Lovenox SC  DISPO: IP    Written by Bonita Mtz MD PGY 1  3/20/2019,  4:14 PM

## 2019-03-20 NOTE — PROGRESS NOTES
Pt arrived to room 4313. Patient in bed with bed in lowest position, call light and belongings within reach. Patient education folder given and reviewed. Safety protocols and unit activities (VS, meds, rounding, etc.) explained to patient/family. No further questions or needs stated at this time. Instructed to call with any needs. All fall precautions in place. Pt educated.    Electronically signed by Kierra Freire RN on 3/20/2019 at 5:31 PM

## 2019-03-20 NOTE — H&P
Internal Medicine  PGY ***  History & Physical      CC : AMS     History Obtained From:  Patient, family member    HISTORY OF PRESENT ILLNESS:   Mrs Michael Hawkins is a 79 y/o female with PMH of lung cancer s/p lobectomy, emphysema, and dementia who presents to the ED from independent living facility with AMS for a day. Majority of the history is obtained from the son. Per son, patient was at her baseline health 2 days back. Patient started becoming \"groggy\" and worsened this morning when according to her son \"pt was pushing her walker into the wall over and over again\" which is when she was brought to the ED. Patient states that her shortness of breath began yesterday along with her productive cough with yellow sputum. Denies CP, N/V/D. Per son, pt is a \"wheezy\" breather at baseline and that she has hoarseness in the past few days. Son also reports that caretaker at the nursing facility reported that pt had a very bad smell this morning and thinks its coming from the abdominal hernia that is covering the skin. At baseline, patient able to converse, move around with a walker, but asks questions over and over again. Also reports that patient has a chronic cough. Reports that patient received a flu shot this year. ED course: Upon arrival to the ED, patient was tachypnea, tachycardic and satting at 70 percent. Pt was put on oxygen. Fluids were initiated. CXR was unremarkable. Trop -ve. Lactate wnl. Influenza A was positive and patient was given Tamiflu in the ED      Past Medical History:        Diagnosis Date    Basal cell carcinoma     Cancer ([facility] [address]     Localized edema 12/19/2017    Other emphysema ([facility] [address] 12/19/2017   ·     Past Surgical History:        Procedure Laterality Date    APPENDECTOMY      LUNG REMOVAL, PARTIAL     ·     Medications Priorto Admission:    · Not in a hospital admission. Allergies:  Vancomycin    Social History:   · TOBACCO:   reports that she quit smoking about 29 years ago.  Her smoking use included cigarettes. She quit after 10.00 years of use. She has never used smokeless tobacco.  · ETOH:   reports that she drinks about 0.6 oz of alcohol per week. · DRUGS : ***  · Patient currently lives {LIVING SITUATION:[id number]}  ·   Family History:   · History reviewed. No pertinent family history. [unfilled]    ROS: A 10 point review of systems was conducted, significant findings as noted in HPI. Physical Exam  Physical exam:      Vitals:    03/20/19 1448   BP: 136/74   Pulse: 94   Resp: 20   SpO2: 98%     GEN: Elderly female, lying in bed comfortably, tachypneic, but alert and following commands. HEENT:   Head: Normocephalic and atraumatic. Neck: Normal range of motion. Neck supple. Cardiovascular:   Tachycardic, irregular rhythm   Pulmonary/Chest:   Tachypneic, diminished breath sounds with wheezing throughout. Abdominal:   Large ventral hernia, no tenderness. Superficial ulceration to lower pannus. No fluctuance or drainage. Neurological: She is alert. Oriented to person and place. No facial droop or dysarthria. Symmetric facial movements. Negative pronator drift. Moving all extremities. Skin: Skin is warm and dry. Psychiatric: She has a normal mood and affect. Her behavior is normal. Judgment and thought content normal.     DATA:    Labs:  CBC:   Recent Labs     03/20/19  1145   WBC 15.6*   HGB 13.8   HCT 41.1          BMP:   Recent Labs     03/20/19  1145   *   K 3.8   CL 87*   CO2 30   BUN 18   CREATININE <0.5*   GLUCOSE 215*     LFT's: No results for input(s): AST, ALT, ALB, BILITOT, ALKPHOS in the last 72 hours. Troponin:   Recent Labs     03/20/19  1145   TROPONINI <0.01     BNP:No results for input(s): BNP in the last 72 hours. ABGs: No results for input(s): PHART, LAT6ILU, PO2ART in the last 72 hours. INR: No results for input(s): INR in the last 72 hours.     U/A:  Recent Labs     03/20/19  1145   COLORU Yellow   PHUR 6.0   WBCUA 0-2   RBCUA 0-2   BACTERIA 2+* CLARITYU Clear   SPECGRAV >=1.030   LEUKOCYTESUR Negative   UROBILINOGEN 0.2   BILIRUBINUR SMALL*   BLOODU TRACE-LYSED*   GLUCOSEU Negative   AMORPHOUS 3+*       CT Head WO Contrast   Final Result      No acute hemorrhage      XR CHEST STANDARD (2 VW)   Final Result      Possible airspace disease in the left lung base. The exam is limited due to patient positioning      Demineralization and moderate to severe dextrocurvature of the thoracic spine                    ASSESSMENT AND PLAN:  Mrs Jorge Abraham is a 81 y/o female with PMH of lung cancer s/p partial right lobectomy, emphysema, and dementia who presents to the ED with acute respiratory failure and AMS    # AMS 2/2 to acute hypoxic hypercarbic respiratory failure   - Refer plan for acute hypoxic hypercarbic respiratory failure below    # Acute hypoxic hypercarbic respiratory failure   Most likely 2/2 to URI (influenza A +ve, pt living at independent living facility) with COPD exacerbation (patient has emphysema with extensive hx of smoking). This is probably worsened by prior right lung partial lobectomy in addition to kyphosis. Patient also has a mild leukocytosis, but afebrile.  Pro-BNP elevated but likely due to atrial stretching   - Start Tamiflu  - Continue home breathing treatments  - f/u UA, Urine Cx, Blood cxs, strep and legionella urine Ag  - Contact precautions  - IVF 75 ml/kg/hr         Will discuss with attending physician Dr. Rachel Parks Status:  FEN:   PPX:   DISPO: Jelly Crockett MD  3/20/2019,  3:25 PM

## 2019-03-21 NOTE — PROGRESS NOTES
Internal Medicine PGY-1 Resident Progress Note        PCP: Buffy Frost MD    Date of Admission: 3/20/2019    Chief Complaint: Altered Mental Status    Subjective:     No acute events overnight. Pt reports feeling ok. She denies any shortness of breath, chest pain, fever, chills, abdominal pain, n/v/c/d. A&Ox2 (self and place, does not know year \"7104\" or why she is here). When told she tested positive for the flu, she denies it, saying she doesn't have the flu. When told she is getting tamiflu to treat it, she said \"I guess, but it wont help since I dont have the flu\". She also notes some thoughts of impending doom, reports that a doctor yesterday gave her the diagnosis of 1 year to live. When asked why she only has one year to live, she cannot elaborate further on what diagnosis, what doctor, or why. Satting well on 2L, will get out of bed and up to chair today. Try to wean O2 further. Mentation appears improved from admission, may be close to baseline although unclear. Medications:  Reviewed    Infusion Medications   Scheduled Medications    budesonide  500 mcg Nebulization BID    escitalopram  5 mg Oral Nightly    guaiFENesin  1,200 mg Oral BID    sodium chloride flush  10 mL Intravenous 2 times per day    enoxaparin  40 mg Subcutaneous Daily    nystatin   Topical BID    predniSONE  40 mg Oral Daily    oseltamivir  30 mg Oral BID     PRN Meds: albuterol, sodium chloride flush, magnesium hydroxide, ondansetron, acetaminophen      Intake/Output Summary (Last 24 hours) at 3/21/2019 0857  Last data filed at 3/21/2019 0353  Gross per 24 hour   Intake 360 ml   Output 200 ml   Net 160 ml       Physical Exam Performed:    BP (!) 161/80   Pulse 91   Temp 97.4 °F (36.3 °C) (Oral)   Resp 22   Ht 4' 11\" (1.499 m)   Wt 100 lb 12 oz (45.7 kg)   SpO2 97%   BMI 20.35 kg/m²     General appearance: On 2L NC. In no apparent distress, sitting reclined in bed, appears stated age and cooperative.   Neck: Supple, with full range of motion. Respiratory:  Normal respiratory effort. Clear to auscultation, bilaterally without rales/wheezes/rhonchi. Cardiovascular: Regular rate and rhythm with normal S1/S2 without murmurs, rubs or gallops. Abdomen: Soft, non-tender, non-distended with normal bowel sounds. Multiple large ventral hernia with superficial panus wound, covered. Musculoskeletal: No clubbing, cyanosis or edema bilaterally. Full range of motion without deformity. Neurologic:  Alert and oriented x2 (self and place). Neurovascularly intact without any focal sensory/motor deficits. Cranial nerves: II-XII intact, grossly non-focal.  Capillary Refill: Brisk,< 3 seconds   Peripheral Pulses: +2 palpable, equal bilaterally       Labs:   Recent Labs     03/20/19  1145 03/21/19  0507   WBC 15.6* 12.8*   HGB 13.8 12.3   HCT 41.1 36.3    263     Recent Labs     03/20/19  1145 03/20/19 2004 03/21/19  0507   * 127* 129*   K 3.8 3.8 3.8   CL 87* 90* 90*   CO2 30 26 29   BUN 18 20 24*   CREATININE <0.5* <0.5* <0.5*   CALCIUM 9.1 8.6 8.9     Recent Labs     03/20/19 2004   AST 23   ALT 13   BILITOT 0.4   ALKPHOS 70     No results for input(s): INR in the last 72 hours. Recent Labs     03/20/19  1145   TROPONINI <0.01       Urinalysis:      Lab Results   Component Value Date    NITRU Negative 03/20/2019    WBCUA 0-2 03/20/2019    BACTERIA 2+ 03/20/2019    RBCUA 0-2 03/20/2019    BLOODU TRACE-LYSED 03/20/2019    SPECGRAV >=1.030 03/20/2019    GLUCOSEU Negative 03/20/2019       Radiology:  CT Head WO Contrast   Final Result      No acute hemorrhage      XR CHEST STANDARD (2 VW)   Final Result      Possible airspace disease in the left lung base.  The exam is limited due to patient positioning      Demineralization and moderate to severe dextrocurvature of the thoracic spine                    Assessment/Plan:    Ambrosio Mitchell is a 80 y.o. female w/ PMH lung cancer s/p lobectomy, emphysema, and dementia presenting w/ altered mental status, found be flu positive. Influenza  Flu positive. - tamiflu 30 BID (renally adjusted)  - droplet precautions    Acute metabolic encephalopathy 2/2 hypoxic hypercarbic respiratory failure  Likely 2/2/ to influenza on top of baseline emphysema. AMS improved this AM, A&Ox2, may be baseline. Pt still requiring 2L NC, RA at home. - treating influenza above  - prednisone 40mg daily  - breathing treatments with pulmicort and albuterol PRN  - will continue to wean O2 as tolerated  - PT/OT    Hx of depression  Pt with thoughts of impending doom, talking about only having 1 year to live. Otherwise no other signs of depression including SI/HI.    - cont home lexapro 5 mg nightly        Code Status: DNR-CCA  F/E/N:   DIET GENERAL;   GI / DVT Prophylaxis: lovenox sq  Disposition:  [facility]      Lety Adams MD  Internal Medicine, PGY 1      I will discuss the patient with the senior resident and attending, Sebastian Greene MD

## 2019-03-21 NOTE — PLAN OF CARE
Problem: Falls - Risk of:  Goal: Will remain free from falls  Description  Will remain free from falls  Outcome: Ongoing  Note:   Patient remained free from falls overnight. Made no attempts to get out of bed overnight. Bed alarm remains active, bed in low/locked position, non-skid footwear in place, and call light in reach. Will continue fall risk protocol. JanFarmDrope Counter 3/21/2019      Problem: Respiratory:  Goal: Respiratory status will improve  Description  Respiratory status will improve  Outcome: Ongoing  Note:   Patient remains on 2L NC overnight, O2 saturation stable but tachypenic at times. Respiratory sounds very congested, moist cough but no sputum expelled overnight. Will continue to monitor.     Janyce Counter 3/21/2019

## 2019-03-21 NOTE — PLAN OF CARE
Problem: Neurological  Intervention: Speech/Dysphagia evaluation/treatment  SLP. Completed evaluation. Please refer to notes in EMR.

## 2019-03-21 NOTE — PLAN OF CARE
Problem: Falls - Risk of:  Goal: Will remain free from falls  Description  Will remain free from falls  Outcome: Met This Shift  Note:   Pt free from injury or falls at this time, fall precautions in place, bed in low position, side rail up x2, Anand Fall Risk: Medium (25-44), bed alarm on, reoriented to room and call light, reminded not to get up without assistance. Pt verbalizes understanding of fall risk procedures. Will continue with hourly rounds for PO intake, pain needs, toileting, and repositioning as needed. No needs expressed at this time. Call light in reach, will continue to monitor. Problem: Respiratory:  Goal: Respiratory status will improve  Description  Respiratory status will improve  Note:   SpO2 level >92% on 1L via high flow nasal cannula. Expiratory wheezing and  fine crackles ausculated in all lung lobes bilaterally. Pt reports occasional non-productive cough. IS encouraged. Medications given as ordered. Will continue to monitor.

## 2019-03-21 NOTE — PROGRESS NOTES
Speech Language Pathology  Facility/Department: [facility] [address] [facility]   BEDSIDE SWALLOW EVALUATION/TREATMENT    NAME: Marcela Moctezuma  : 1927  MRN: [id number]    ADMISSION DATE: 3/20/2019  ADMITTING DIAGNOSIS: has Other emphysema ([address] Other osteoporosis without current pathological fracture; Recurrent falls while walking; H/O: lung cancer; Bronchiectasis with acute exacerbation ([address] Hypertensive urgency; and Influenza on their problem list.  ONSET DATE: 3/20/19    Recent Chest Xray (3/20/19):   Possible airspace disease in the left lung base. The exam is limited due to patient positioning       Demineralization and moderate to severe dextrocurvature of the thoracic spine     CT of Head (3/2019):      No acute hemorrhage     Date of Eval: 3/21/2019  Evaluating Therapist: Stacia Crespo    Current Diet level:  Current Diet : Regular  Current Liquid Diet : Thin    Primary Complaint  Patient Complaint: Pt denied difficulty swallowing    Pain:  Pain Assessment  Patient Currently in Pain: Denies  Pain Level: 0  Response to Pain Intervention: Patient Satisfied    Reason for Referral  Marcela Moctezuma was referred for a bedside swallow evaluation to assess the efficiency of her swallow function, identify signs and symptoms of aspiration and make recommendations regarding safe dietary consistencies, effective compensatory strategies, and safe eating environment. Impression  Dysphagia Diagnosis: Swallow function appears grossly intact  Dysphagia Impression : Pt was seen sitting up in bed, alert and oriented to self and place; not oriented to month, year, or situation. Pt is currently on 2L O2 via NC. Pt's swallowing function is suspected to be grossly [facility] for pt's age. Pt exhibited mildly prolonged (but functional) a-p propulsion; also question mildly reduced pharyngeal clearance (multiple swallows). Slight strained vocal quality noted immediately after the swallow x2 with thin liquid.  No overt coughing, throat clearing, or wet vocal quality noted across trials, including successive drinks of thin liquid via straw. Dysphagia Outcome Severity Scale: Level 6: Within functional limits/Modified independence     Treatment Plan  Requires SLP Intervention: Yes  Duration/Frequency of Treatment: 1-2x   D/C Recommendations: To be determined    Recommended Diet and Intervention  Diet Solids Recommendation: Regular  Liquid Consistency Recommendation: Thin; if pt presents with worsening respiratory status or concern for aspiration pneumonia, please change to NPO and notify SLP  Recommended Form of Meds: Other(As tolerated; if difficulty, place in puree)  Therapeutic Interventions: Diet tolerance monitoring;Patient/Family education;Oral care    Compensatory Swallowing Strategies  Compensatory Swallowing Strategies: Upright as possible for all oral intake    Treatment/Goals  Dysphagia Goals:   1.) The patient will tolerate recommended diet without observed clinical signs of aspiration  2.) The patient/caregiver will demonstrate understanding of compensatory strategies for improved swallowing safety. 3/21/19: Pt educated on purpose of swallowing evaluation, recommendations, precautions, and POC. Continue goal.     General  Chart Reviewed: Yes  Behavior/Cognition: Alert; Cooperative  Respiratory Status: O2 via nasual cannula(2L)  Communication Observation: Functional  Follows Directions: Simple  Dentition: Adequate  Patient Positioning: Upright in bed  Baseline Vocal Quality: Dysphonic;Hoarse  Volitional Cough: Congested  Prior Dysphagia History: None located  Consistencies Administered: Thin - cup; Thin - straw;Puree; Ice Chips;Reg solid    Vision/Hearing  Vision  Vision: Impaired  Vision Exceptions: Wears glasses for distance  Hearing  Hearing: Exceptions to [facility]  Hearing Exceptions: Hard of hearing/hearing concerns    Oral Motor Deficits  Oral/Motor  Oral Motor:  Within functional limits    Oral Phase Dysfunction  Mildly prolonged (but functional) a-p propulsion  Suspected to be grossly WFL for pt's age     Indicators of Pharyngeal Phase Dysfunction  Question mildly reduced pharyngeal clearance (multiple swallows)  Suspected to be grossly [facility] for pt's age  Slight strained vocal quality noted immediately after the swallow x2 with thin liquid  No overt coughing, throat clearing, or wet vocal quality noted across trials, including successive drinks of thin liquid via straw    Prognosis  Prognosis  Prognosis for safe diet advancement: good  Barriers to reach goals: age  Individuals consulted  Consulted and agree with results and recommendations: Patient;RN    Education  Patient Education: Pt educated on purpose of eval  Patient Education Response: Verbalizes understanding    Therapy Time  SLP Individual Minutes  Time In: 0816  Time Out: 0840  Minutes: 24    Plan  Diet Recommendations: Regular textures, thin liquids  Discharge Recommendations:  To be determined  Patient Goal: To continue current diet  Discharge Goal: To be determined  Plan discussed with RN; pt's needs met prior to exiting room       DONNELL De Oliveira Pathologist   3/21/2019 9:03 AM     If patient is discharged prior to next treatment, this note will serve as the discharge summary

## 2019-03-22 NOTE — PROGRESS NOTES
Physical Therapy    Facility/Department: [facility] [address] [facility]  Initial Assessment and treatment    NAME: Marco Flores  : 1927  MRN: [id number]    Date of Service: 3/22/2019    Discharge Recommendations:    Marco Flores scored a 17/24 on the AM-PAC short mobility form. Current research shows that an AM-PAC score of 17 or less is typically not associated with a discharge to the patient's home setting. Based on the patients AM-PAC score and their current functional mobility deficits, it is recommended that the patient have 3-5 sessions per week of Physical Therapy at d/c to increase the patients independence. PT Equipment Recommendations  Equipment Needed: No(defer to next level of care)    Assessment   Body structures, Functions, Activity limitations: Decreased functional mobility ; Decreased endurance;Decreased strength;Decreased balance;Decreased safe awareness  Assessment: Patient tolerated session well, transferring to EOB and over to bedside chair with CGA and use of FWW as above. Patient demonstrates quick fatigue with mobility, requiring increased time to complete tasks. She needs verbal encouargement to participate in treatment session due to generalized fatigue. Patient from independent living. She is currently functioning below baseline level. Recommend continued skilled PT upon discharge. Treatment Diagnosis: impaired mobility associated with influenza  Prognosis: Good  Decision Making: Low Complexity  Patient Education: Educated on role of PT, safety with mobility, use of call light; patient verbalized understanding. REQUIRES PT FOLLOW UP: Yes  Activity Tolerance  Activity Tolerance: Patient Tolerated treatment well;Patient limited by fatigue;Patient limited by endurance       Patient Diagnosis(es): The primary encounter diagnosis was Altered mental status, unspecified altered mental status type. Diagnoses of Flu and Hypoxia were also pertinent to this visit.      has a past medical falls, home PT  Cognition   Cognition  Overall Cognitive Status: Exceptions  Arousal/Alertness: Appropriate responses to stimuli  Following Commands:  Follows one step commands with repetition  Attention Span: Attends with cues to redirect  Memory: Decreased recall of recent events;Decreased short term memory  Initiation: Requires cues for some  Sequencing: Requires cues for some    Objective          AROM RLE (degrees)  RLE AROM: WFL  AROM LLE (degrees)  LLE AROM : WFL  Strength RLE  Strength RLE: WFL  Strength LLE  Strength LLE: WFL        Bed mobility  Supine to Sit: Stand by assistance(head of bed elevated, increased time to complete tasks, effortful)  Transfers  Sit to Stand: Contact guard assistance  Stand to sit: Contact guard assistance  Bed to Chair: Contact guard assistance(to take steps with FWW)  Ambulation  Ambulation?: Yes  Ambulation 1  Surface: level tile  Device: Rolling Walker  Assistance: Contact guard assistance  Quality of Gait: decreased step length/height bilaterally with shuffling steps, gait fairly steady with use of FWW, forward flexed posture  Distance: 2-3 steps from edge of bed to bedside chair  Stairs/Curb  Stairs?: No     Balance  Sitting - Static: Good  Standing - Static: Fair(CGA with UE support)  Standing - Dynamic: Fair(CGA to ambulate with FWW)        Plan   Plan  Times per week: 2-5  Current Treatment Recommendations: Strengthening, Gait Training, Patient/Caregiver Education & Training, Balance Training, Functional Mobility Training, Endurance Training, Transfer Training, Safety Education & Training  Safety Devices  Type of devices: Call light within reach, Chair alarm in place, Nurse notified, Left in chair      OutComes Score                                                  AM-PAC Score  AM-PAC Inpatient Mobility Raw Score : 17  AM-PAC Inpatient T-Scale Score : 42.13  Mobility Inpatient CMS 0-100% Score: 50.57  Mobility Inpatient CMS G-Code Modifier : CK          Goals  Short term goals  Time Frame for Short term goals: discharge  Short term goal 1: patient will perform bed mobility with modified independence  Short term goal 2: patient will perform transfers sit<>stand with supervision  Short term goal 3: patient will ambulate 48' with FWW and supervision  Patient Goals   Patient goals : none stated       Therapy Time   Individual Concurrent Group Co-treatment   Time In 1004         Time Out 1034         Minutes 30         Timed Code Treatment Minutes: 15 Minutes    Timed Code Treatment Minutes:  15 Minutes    Total Treatment Minutes:    15 minutes treatment + 15 minutes evaluation = 30 total treatment minutes    If patient is discharged from the hospital prior to next treatment session, this note will serve as the discharge summary.      Laurie GONZALEZ [address]

## 2019-03-22 NOTE — CARE COORDINATION
Case Management Daily Note:    Current Plan of Care: Wean 02, treating influenza      PT AM-PAC: 17/24  Per last eval on: today    OT AM-PAC: 17/24 Per last eval on: today    DME needs: none      Discharge Plan: return to [address] [place] [address]    Tentative Discharge Date: TBD    Current Barriers to Discharge: none    Resources/Information given: none      Case Management Notes: SW tried meeting with Pt this afternoon to complete initial assessment but Pt reported she was tired from \"all the stuff they had me do today. \"  Pt wanting to nap. SW is aware that Pt resides at The [address] [place] [address]. OLIVER/DEL will follow for any Home Care or DME needs at discharge.     Kimberly Reese, [place]  Case Management  [phone]

## 2019-03-22 NOTE — PROGRESS NOTES
Occupational Therapy   Occupational Therapy Initial Assessment/tx  Date: 3/22/2019   Patient Name: Ambrosio Mitchell  MRN: [id number]     : 1927    Date of Service: 3/22/2019    Discharge Recommendations:  Ambrosio Mitchell scored a 17/24 on the AM-PAC ADL Inpatient form. Current research shows that an AM-PAC score of 18 or greater is typically associated with a discharge to the patient's home setting. Based on the patients AM-PAC score and their current ADL deficits, it is recommended that the patient have 2-3 sessions per week of Occupational Therapy at d/c to increase the patients independence. Assessment   Performance deficits / Impairments: Decreased functional mobility ; Decreased ADL status  Assessment: Pt is functioning below baseline level, needing A with functional transfers, has limited activity tolerance. Pt is not safe to be home alone, at risk of falling. Recommend ongoing OT at discharge to maximize functional abilities. Treatment Diagnosis: impaired transfers and activity tolerance  Prognosis: Good  Decision Making: Low Complexity  Patient Education: role of OT,-verbalized understanding, needs reinforcement  REQUIRES OT FOLLOW UP: Yes  Activity Tolerance  Activity Tolerance: Patient limited by fatigue  Safety Devices  Safety Devices in place: Yes  Type of devices: Left in chair;Nurse notified;Call light within reach           Patient Diagnosis(es): The primary encounter diagnosis was Altered mental status, unspecified altered mental status type. Diagnoses of Flu and Hypoxia were also pertinent to this visit. has a past medical history of Basal cell carcinoma, Cancer ([facility] [address] Localized edema, and Other emphysema ([facility] [address]    has a past surgical history that includes Appendectomy and Lung removal, partial.    Treatment Diagnosis: impaired transfers and activity tolerance      Restrictions  Position Activity Restriction  Other position/activity restrictions: up with assistance, droplet isolation    Subjective   General  Chart Reviewed: Yes  Patient assessed for rehabilitation services?: Yes  Additional Pertinent Hx: PMH:  appy, basal cell carcinoma,lung cancer partial lung removal  Family / Caregiver Present: No  Referring Practitioner: Dr. Quezada Feeling  Diagnosis: Pt admitted with AMS, dx of hypoxia and influenza-CXR=airspace dz, head CT=neg  Subjective  Subjective: Pt in bed, stating she is very weak, agreeable to transfer to chair for breakfast.  Pain Assessment  Pain Assessment: denies pain  Social/Functional History  Social/Functional History  Type of Home: Assisted living([facility])  Home Access: Elevator  Bathroom Equipment: Shower chair  Home Equipment: Alert Button, Rolling walker(walker tray)  ADL Assistance: Needs assistance(assist with showers, able to dress indep)  Homemaking Assistance: Needs assistance(eats majority of meals in dining room)  Homemaking Responsibilities: No  Ambulation Assistance: (with rolling walker)  Transfer Assistance: Independent(with rolling walker)  Active : No  Additional Comments: has had falls, home PT       Objective        Orientation  Overall Orientation Status: Impaired  Orientation Level: Disoriented to time;Oriented to person;Oriented to place     Standing Balance  Sit to stand: Contact guard assistance(increased effort)  Stand to sit: Contact guard assistance  Toilet Transfers  Toilet - Technique: Stand step(with rolling walker)  Equipment Used: (simulated 3 in1 commode)  Toilet Transfer: Contact guard assistance  ADL  Feeding: Independent        Bed mobility  Supine to Sit: Stand by assistance(head of bed elevated, increased time/effort)  Transfers  Sit to stand: Contact guard assistance(increased effort)  Stand to sit: Contact guard assistance     Cognition  Overall Cognitive Status: Exceptions  Arousal/Alertness: Appropriate responses to stimuli  Following Commands:  Follows one step commands with repetition  Attention Span: Attends with cues to redirect  Memory: Decreased recall of recent events;Decreased short term memory  Initiation: Requires cues for some  Sequencing: Requires cues for some                          Hand Dominance  Hand Dominance: Right     Patient participated in OT eval and tx including ADLs and transfer        Plan   Plan  Times per week: 2-5  Times per day: Daily  Current Treatment Recommendations: Self-Care / ADL, Functional Mobility Training    G-Code     OutComes Score                                                  AM-PAC Score        AM-PAC Inpatient Daily Activity Raw Score: 17  AM-PAC Inpatient ADL T-Scale Score : 37.26  ADL Inpatient CMS 0-100% Score: 50.11  ADL Inpatient CMS G-Code Modifier : CK    Goals  Short term goals  Time Frame for Short term goals: discharge  Short term goal 1: pt to transfer to commode with S/SBA  Short term goal 2: pt to stand for 3 minutes with SBA while doing simple ADLs  Short term goal 3: pt to participate in LE dressing assessment  Short term goal 4: pt to tolerate 5-10 reps B UE AROM exerc to increase activity tolerance  Patient Goals   Patient goals : not stated       Therapy Time   Individual Concurrent Group Co-treatment   Time In 1004         Time Out 1034         Minutes 30              Timed Code Treatment Minutes:  15 Minutes    Total Treatment Minutes:  30    If patient is d/c prior to next treatment session, this note will serve as the discharge summary  MARLO Villegas/

## 2019-03-22 NOTE — PLAN OF CARE
Problem: Falls - Risk of:  Goal: Will remain free from falls  Description  Will remain free from falls  3/22/2019 0249 by Gt Dueñas RN  Outcome: Ongoing  Note:   No falls this shift. Call light in reach, bed in lowest position, bed alarm in use. Patient is close to the nurses station with the door open. Will continue to monitor. Problem: Risk for Impaired Skin Integrity  Goal: Tissue integrity - skin and mucous membranes  Description  Structural intactness and normal physiological function of skin and  mucous membranes. Outcome: Ongoing  Note:   No s/s of new impaired skin integrity. Will continue to monitor.

## 2019-03-22 NOTE — PROGRESS NOTES
Internal Medicine PGY-1 Resident Progress Note        PCP: James Goldsmith MD    Date of Admission: 3/20/2019    Chief Complaint: Altered Mental Status    Subjective:     No acute events overnight. Pt reports feeling weak with myalgias. She now seems to understand that she has the flu, although per the nurse, too a lot of redirecting. Pt says shes in a good mood since her son was able to visit her this morning. She is excited that he says he will come back again after work. Denies any fever, chills, chest pain shortness of breath, abd pain, nv/c/d. A&Ox3 (self,place, situation and does not know year). No longer perseverating on \"terminal illness\". Satting well on 2L, PT/OT today. Medications:  Reviewed    Infusion Medications   Scheduled Medications    budesonide  500 mcg Nebulization BID    escitalopram  5 mg Oral Nightly    guaiFENesin  1,200 mg Oral BID    sodium chloride flush  10 mL Intravenous 2 times per day    enoxaparin  40 mg Subcutaneous Daily    nystatin   Topical BID    predniSONE  40 mg Oral Daily    oseltamivir  30 mg Oral BID     PRN Meds: albuterol, sodium chloride flush, magnesium hydroxide, ondansetron, acetaminophen      Intake/Output Summary (Last 24 hours) at 3/22/2019 0914  Last data filed at 3/22/2019 0439  Gross per 24 hour   Intake 940 ml   Output 250 ml   Net 690 ml       Physical Exam Performed:    BP (!) 179/88   Pulse 89   Temp 97.1 °F (36.2 °C) (Oral)   Resp 24   Ht 4' 11\" (1.499 m)   Wt 100 lb 8.5 oz (45.6 kg)   SpO2 92%   BMI 20.30 kg/m²     General appearance: On 2L NC. In no apparent distress, sitting reclined in bed, appears stated age and cooperative. Neck: Supple, with full range of motion. Respiratory:  Normal respiratory effort. Clear to auscultation, bilaterally without rales/wheezes/rhonchi. Cardiovascular: Regular rate and rhythm with normal S1/S2 without murmurs, rubs or gallops.   Abdomen: Soft, non-tender, non-distended with normal bowel sounds. Multiple large ventral hernia with superficial panus wound, covered. Musculoskeletal: No clubbing, cyanosis or edema bilaterally. Full range of motion without deformity. Neurologic:  Alert and oriented x3 (self, place, situation, not to time). Neurovascularly intact without any focal sensory/motor deficits. Cranial nerves: II-XII intact, grossly non-focal.  Capillary Refill: Brisk,< 3 seconds   Peripheral Pulses: +2 palpable, equal bilaterally       Labs:   Recent Labs     03/20/19  1145 03/21/19  0507 03/22/19  0528   WBC 15.6* 12.8* 14.6*   HGB 13.8 12.3 12.6   HCT 41.1 36.3 37.5    263 315     Recent Labs     03/20/19 2004 03/21/19  0507 03/22/19  0824   * 129* 129*   K 3.8 3.8 4.1   CL 90* 90* 90*   CO2 26 29 30   BUN 20 24* 20   CREATININE <0.5* <0.5* <0.5*   CALCIUM 8.6 8.9 8.7   PHOS  --   --  2.3*     Recent Labs     03/20/19 2004   AST 23   ALT 13   BILITOT 0.4   ALKPHOS 70     No results for input(s): INR in the last 72 hours. Recent Labs     03/20/19  1145   TROPONINI <0.01       Urinalysis:      Lab Results   Component Value Date    NITRU Negative 03/21/2019    WBCUA 3-5 03/21/2019    BACTERIA 3+ 03/21/2019    RBCUA 0-2 03/21/2019    BLOODU Negative 03/21/2019    SPECGRAV >=1.030 03/21/2019    GLUCOSEU Negative 03/21/2019       Radiology:  CT Head WO Contrast   Final Result      No acute hemorrhage      XR CHEST STANDARD (2 VW)   Final Result      Possible airspace disease in the left lung base. The exam is limited due to patient positioning      Demineralization and moderate to severe dextrocurvature of the thoracic spine                    Assessment/Plan:    Jl Nguyen is a 80 y.o. female w/ PMH lung cancer s/p lobectomy, emphysema, and dementia presenting w/ altered mental status, found be flu positive. Influenza  Flu positive.   - tamiflu 30 BID (renally adjusted)  - droplet precautions    Acute metabolic encephalopathy 2/2 hypoxic hypercarbic respiratory failure  Likely 2/2/ to influenza on top of baseline emphysema. AMS improved this AM, A&Ox2, may be baseline. Pt still requiring 2L NC, RA at home. - treating influenza above  - prednisone 40mg daily  - breathing treatments with pulmicort and albuterol PRN  - will continue to wean O2 as tolerated  - PT/OT    Hx of depression  Pt with thoughts of impending doom, talking about only having 1 year to live. Otherwise no other signs of depression including SI/HI.    - cont home lexapro 5 mg nightly        Code Status: DNR-CCA  F/E/N:   DIET GENERAL;   GI / DVT Prophylaxis: lovenox sq  Disposition:  [facility]      Jose Barnes MD  Internal Medicine, PGY 1      I will discuss the patient with the senior resident and attending, Irina Bowen MD

## 2019-03-22 NOTE — PLAN OF CARE
Problem: Falls - Risk of:  Goal: Will remain free from falls  Description  Will remain free from falls  Note:   Pt free from injury or falls at this time, fall precautions in place, bed in low position, side rail up x2, Anand Fall Risk: High, bed alarm on, reoriented to room and call light, reminded not to get up without assistance. Pt verbalizes understanding of fall risk procedures. Will continue with hourly rounds for PO intake, pain needs, toileting, and repositioning as needed. No needs expressed at this time. Call light in reach, will continue to monitor. Problem: Activity:  Goal: Ability to return to normal activity level will improve  Description  Ability to return to normal activity level will improve  Note:   SpO2 level >92% on 2L via high flow nasal cannula. Rhonci and  Fine crackles ausculated in all lung lobes bilaterally. Pt reports occasional non-productive cough. IS encouraged. Medications given as ordered. Will continue to monitor.

## 2019-03-22 NOTE — PROGRESS NOTES
Speech Language Pathology  Facility/Department: [place] [address] [facility]  Dysphagia Daily Treatment Note/DC    NAME: Jomar Lozano  : 1927  MRN: [id number]    Patient Diagnosis(es):   Patient Active Problem List    Diagnosis Date Noted    Altered mental status     Influenza 2019    Bronchiectasis with acute exacerbation ([facility] [address] 2018    Hypertensive urgency 2018    Other emphysema ([facility] [address] 2017    Other osteoporosis without current pathological fracture 2017    Recurrent falls while walking 2017    H/O: lung cancer 2017     Allergies: Allergies   Allergen Reactions    Vancomycin Hives     Recent Chest Xray (3/20/19):   Possible airspace disease in the left lung base. The exam is limited due to patient positioning       Demineralization and moderate to severe dextrocurvature of the thoracic spine      CT of Head (3/2019):       No acute hemorrhage        Previous MBS - none    Chart reviewed. Medical Diagnosis: influenza  Treatment Diagnosis: dysphagia    BSE Impression 3/21/19  Pt was seen sitting up in bed, alert and oriented to self and place; not oriented to month, year, or situation. Pt is currently on 2L O2 via NC. Pt's swallowing function is suspected to be grossly [facility] for pt's age. Pt exhibited mildly prolonged (but functional) a-p propulsion; also question mildly reduced pharyngeal clearance (multiple swallows). Slight strained vocal quality noted immediately after the swallow x2 with thin liquid.  No overt coughing, throat clearing, or wet vocal quality noted across trials, including successive     MBS results - not indicated    Pain: none    Current Diet : regular with thin liquids    Treatment:  Pt seen bedside to address the following goals:  1.) The patient will tolerate recommended diet without observed clinical signs of aspiration  3/22 pt sitting up in chair eating breakfast. Pt eating eggs, fresh fruit, cookies family brought and water via cup

## 2019-03-23 PROBLEM — I47.1 SVT (SUPRAVENTRICULAR TACHYCARDIA) (HCC): Status: ACTIVE | Noted: 2019-01-01

## 2019-03-23 NOTE — PROGRESS NOTES
Internal Medicine PGY-1 Resident Progress Note        PCP: Gabe Marshall MD    Date of Admission: 3/20/2019    Chief Complaint: Altered Mental Status    Subjective:     No acute events overnight. Pt seen at bedside resting comfortably with son Bravo Reed) in the room. Pt denies F/C/N/V, HA, fatigue, CP, dyspnea, abdominal pain, constipation/diarrhea, and urinary symptoms. She states that she feels much better and per son she is more AAO and has increased appetite. Medications:  Reviewed    Infusion Medications   Scheduled Medications    amoxicillin-clavulanate  1 tablet Oral 2 times per day    budesonide  500 mcg Nebulization BID    escitalopram  5 mg Oral Nightly    guaiFENesin  1,200 mg Oral BID    sodium chloride flush  10 mL Intravenous 2 times per day    enoxaparin  40 mg Subcutaneous Daily    nystatin   Topical BID    predniSONE  40 mg Oral Daily    oseltamivir  30 mg Oral BID     PRN Meds: albuterol, sodium chloride flush, magnesium hydroxide, ondansetron, acetaminophen      Intake/Output Summary (Last 24 hours) at 3/23/2019 1231  Last data filed at 3/23/2019 0255  Gross per 24 hour   Intake 410 ml   Output 440 ml   Net -30 ml       Physical Exam Performed:    BP (!) 159/79   Pulse 80   Temp 97.9 °F (36.6 °C) (Oral)   Resp 18   Ht 4' 11\" (1.499 m)   Wt 104 lb 4.4 oz (47.3 kg)   SpO2 94%   BMI 21.06 kg/m²     General appearance: On 1L NC. In no apparent distress, sitting reclined in bed, appears stated age and cooperative. Neck: Supple, with full range of motion. Respiratory:  Normal respiratory effort. Clear to auscultation, bilaterally without rales/wheezes/rhonchi. Cardiovascular: Regular rate and rhythm with normal S1/S2 without murmurs, rubs or gallops. Abdomen: Soft, non-tender, non-distended with normal bowel sounds. Multiple large ventral hernia with superficial panus wound, covered. Musculoskeletal: No clubbing, cyanosis or edema bilaterally.   Full range of motion without deformity. Neurologic:  Alert and oriented x3 (self, place, situation, not to time). Neurovascularly intact without any focal sensory/motor deficits. Cranial nerves: II-XII intact, grossly non-focal.  Capillary Refill: Brisk,< 3 seconds   Peripheral Pulses: +2 palpable, equal bilaterally     Labs:   Recent Labs     03/21/19  0507 03/22/19  0528 03/23/19  0532   WBC 12.8* 14.6* 9.2   HGB 12.3 12.6 12.1   HCT 36.3 37.5 35.4*    315 304     Recent Labs     03/21/19  0507 03/22/19  0824 03/23/19  0531   * 129* 129*   K 3.8 4.1 4.1   CL 90* 90* 90*   CO2 29 30 34*   BUN 24* 20 18   CREATININE <0.5* <0.5* <0.5*   CALCIUM 8.9 8.7 8.6   PHOS  --  2.3*  --      Recent Labs     03/20/19 2004   AST 23   ALT 13   BILITOT 0.4   ALKPHOS 70     No results for input(s): INR in the last 72 hours. No results for input(s): Katie Jubilee in the last 72 hours. Urinalysis:      Lab Results   Component Value Date    NITRU Negative 03/21/2019    WBCUA 3-5 03/21/2019    BACTERIA 3+ 03/21/2019    RBCUA 0-2 03/21/2019    BLOODU Negative 03/21/2019    SPECGRAV >=1.030 03/21/2019    GLUCOSEU Negative 03/21/2019       Radiology:  CT Head WO Contrast   Final Result      No acute hemorrhage      XR CHEST STANDARD (2 VW)   Final Result      Possible airspace disease in the left lung base. The exam is limited due to patient positioning      Demineralization and moderate to severe dextrocurvature of the thoracic spine                    Assessment/Plan:    Martin Joyce is a 80 y.o. female w/ PMH lung cancer s/p lobectomy, emphysema, and dementia presenting w/ altered mental status, found be flu positive. Acute metabolic encephalopathy 2/2 hypoxic hypercarbic respiratory failure (resolving)  Likely 2/2 influenza on top of baseline emphysema. AMS improved this AM, A&Ox2, may be baseline. Pt still requiring 2L NC, RA at home.    - treating influenza as below  - prednisone 40mg daily  - breathing treatments with pulmicort and albuterol PRN  - will continue to wean O2 as tolerated  - PT/OT  - Strep pneumonia (+); continue augmentin    Influenza  Flu positive. - tamiflu 30 BID (renally adjusted)  - droplet precautions    Hx of depression  Pt with thoughts of impending doom, talking about only having 1 year to live. Otherwise no other signs of depression including SI/HI.    - cont home lexapro 5 mg nightly    PT: [facility]  OT: [facility]     Code Status: DNR-CCA  F/E/N:   DIET GENERAL; No Caffeine   GI / DVT Prophylaxis: lovenox sq  Disposition:  [facility]      Josiah Alaniz MD  Internal Medicine, PGY 1      I will discuss the patient with the senior resident and attending, Neris Valentino MD

## 2019-03-23 NOTE — PLAN OF CARE
Problem: Falls - Risk of:  Goal: Will remain free from falls  Description  Will remain free from falls  3/23/2019 1213 by Baljeet Jo RN  Outcome: Ongoing  3/23/2019 0346 by Miller Taylor RN  Outcome: Ongoing  Goal: Absence of physical injury  Description  Absence of physical injury  Outcome: Ongoing     Problem: Risk for Impaired Skin Integrity  Goal: Tissue integrity - skin and mucous membranes  Description  Structural intactness and normal physiological function of skin and  mucous membranes. 3/23/2019 1213 by Baljeet Jo RN  Outcome: Ongoing  3/23/2019 0346 by Miller Taylor RN  Outcome: Ongoing     Problem:  Activity:  Goal: Energy level will increase  Description  Energy level will increase  3/23/2019 1213 by Baljeet Jo RN  Outcome: Ongoing  3/23/2019 0346 by Miller Taylor RN  Outcome: Ongoing  Goal: Ability to return to normal activity level will improve  Description  Ability to return to normal activity level will improve  Outcome: Ongoing     Problem: Respiratory:  Goal: Respiratory status will improve  Description  Respiratory status will improve  3/23/2019 1213 by Baljeet Jo RN  Outcome: Ongoing  3/23/2019 0346 by Miller Taylor RN  Outcome: Ongoing

## 2019-03-23 NOTE — PROGRESS NOTES
pt had some runs of PSVT with the heart rate in-between 140-143. On assessment pt was asleep. denied chest pain. Vitals signs are stable. Resident notified. will monitor.

## 2019-03-23 NOTE — PLAN OF CARE
Problem: Falls - Risk of:  Goal: Will remain free from falls  Description  Will remain free from falls  3/23/2019 0346 by Deepthi Solis RN  Outcome: Ongoing   Pt remains without falls during this shift. Pt does not attempt to get OOB alone. Pt able to call out appropriately, call light within reach. Safety precautions in place include fall armband, fall towel, chair & bed alarms, non slip foot wear. Signs posted on door, and door remains open for observation. The bed is in lowest position, wheels are locked, and rails are up 2/4. Continue with current care. Problem: Risk for Impaired Skin Integrity  Goal: Tissue integrity - skin and mucous membranes  Description  Structural intactness and normal physiological function of skin and  mucous membranes. Outcome: Ongoing  Pt with no new skin breakdown during this shift. Vital signs are stable, pt able to transfer appropriately. Pt able to turn self.  Continue with current care. '

## 2019-03-24 NOTE — PLAN OF CARE
Problem: Falls - Risk of:  Goal: Will remain free from falls  Description  Will remain free from falls  3/24/2019 1834 by Danielle Ferreira RN  Outcome: Ongoing  Note:   Pt is A&Ox4, is high fall risk, hx of falls, experiencing gen weakness, Pt educated and encouraged to use call light to notify and wait for assistance from staff before getting OOB, pt uses call light appropriately, has made no unsafe movements, no attempts to get OOB without assistance. Pt's bed alarm remained on throughout shift, bed in lowest position, 2/4 side rails up, call light and bedside table within reach. No falls so far this shift, will continue to monitor. 3/24/2019 0944 by Kranthi Hartley RN  Outcome: Ongoing     Problem: Respiratory:  Goal: Respiratory status will improve  Description  Respiratory status will improve  3/24/2019 1834 by Danielle Ferreira RN  Outcome: Ongoing  Note:   Patient receiving intermittent breathing treatments at this time. Some relief per patient.    3/24/2019 0944 by Kranthi Hartley RN  Outcome: Ongoing

## 2019-03-24 NOTE — PLAN OF CARE
Problem: Falls - Risk of:  Goal: Will remain free from falls  Description  Will remain free from falls  3/24/2019 0944 by April Lopez RN  Outcome: Ongoing  3/23/2019 2218 by Shaquille Castrejon RN  Outcome: Ongoing  Note:   Pt is a high fall risk. See [facility] Fall Score. Pt bed is in low position, side rails up, call light and belongings are in reach. Pt up x 1 w/walker and gait belt. Bed alarm in use. Pt encouraged to call for assistance, pt using call light appropriately. Will continue with hourly rounds for po intake, pain needs, toileting and repositioning as needed. Goal: Absence of physical injury  Description  Absence of physical injury  Outcome: Ongoing     Problem: Risk for Impaired Skin Integrity  Goal: Tissue integrity - skin and mucous membranes  Description  Structural intactness and normal physiological function of skin and  mucous membranes. Outcome: Ongoing     Problem: Activity:  Goal: Energy level will increase  Description  Energy level will increase  Outcome: Ongoing  Goal: Ability to return to normal activity level will improve  Description  Ability to return to normal activity level will improve  Outcome: Ongoing     Problem: Respiratory:  Goal: Respiratory status will improve  Description  Respiratory status will improve  3/24/2019 0944 by April Lopez RN  Outcome: Ongoing  3/23/2019 2218 by Shaquille Castrejon RN  Outcome: Ongoing  Note:   Coarse crackles noted during auscultation of lungs. Pt on routine nebulizer. Non productive wet cough. IS education given to patient. Pt on 1-2 L of O2. Will cont to monitor.

## 2019-03-24 NOTE — PROGRESS NOTES
Internal Medicine PGY-2 Resident Progress Note        PCP: Triston Irwin MD    Date of Admission: 3/20/2019    Chief Complaint: Altered Mental Status    Subjective:     No events overnight. Patient seen and examined. Patient has no new complaints this morning. SOB and appetite are improving, denies chest pain, diarrhea, constipation, abdominal pain. Medications:  Reviewed    Infusion Medications   Scheduled Medications    metoprolol succinate  25 mg Oral Daily    albuterol  2.5 mg Nebulization TID    amoxicillin-clavulanate  1 tablet Oral 2 times per day    budesonide  500 mcg Nebulization BID    escitalopram  5 mg Oral Nightly    guaiFENesin  1,200 mg Oral BID    sodium chloride flush  10 mL Intravenous 2 times per day    enoxaparin  40 mg Subcutaneous Daily    nystatin   Topical BID    predniSONE  40 mg Oral Daily    oseltamivir  30 mg Oral BID     PRN Meds: albuterol, sodium chloride flush, magnesium hydroxide, ondansetron, acetaminophen      Intake/Output Summary (Last 24 hours) at 3/24/2019 1346  Last data filed at 3/24/2019 0936  Gross per 24 hour   Intake 200 ml   Output --   Net 200 ml       Physical Exam Performed:    BP (!) 152/79   Pulse 94   Temp 97.5 °F (36.4 °C) (Oral)   Resp 22   Ht 4' 11\" (1.499 m)   Wt 104 lb 4.4 oz (47.3 kg)   SpO2 95%   BMI 21.06 kg/m²     General appearance: On 2L NC. In no apparent distress, sitting reclined in bed, appears stated age and cooperative. Neck: Supple, with full range of motion. Respiratory:  Normal respiratory effort. Clear to auscultation, bilaterally without rales/wheezes/rhonchi. Cardiovascular: Regular rate and rhythm with normal S1/S2 without murmurs, rubs or gallops. Abdomen: Soft, non-tender, non-distended with normal bowel sounds. Multiple large ventral hernia with superficial panus wound, covered. Musculoskeletal: No clubbing, cyanosis or edema bilaterally. Full range of motion without deformity.   Neurologic:  Alert and

## 2019-03-24 NOTE — PLAN OF CARE
Problem: Falls - Risk of:  Goal: Will remain free from falls  Description  Will remain free from falls  3/23/2019 2218 by Guanakito Whitfield RN  Outcome: Ongoing  Note:   Pt is a high fall risk. See [facility] Fall Score. Pt bed is in low position, side rails up, call light and belongings are in reach. Pt up x 1 w/walker and gait belt. Bed alarm in use. Pt encouraged to call for assistance, pt using call light appropriately. Will continue with hourly rounds for po intake, pain needs, toileting and repositioning as needed. 3/23/2019 1213 by Chapo Marrero RN  Outcome: Ongoing     Problem: Respiratory:  Goal: Respiratory status will improve  Description  Respiratory status will improve  3/23/2019 2218 by Guanakito Whitfield RN  Outcome: Ongoing  Note:   Coarse crackles noted during auscultation of lungs. Pt on routine nebulizer. Non productive wet cough. IS education given to patient. Pt on 1-2 L of O2. Will cont to monitor.    3/23/2019 1213 by Chapo Marrero RN  Outcome: Ongoing

## 2019-03-24 NOTE — PROGRESS NOTES
Pt A & O. Pt up x 1 w/walker and gait belt. Bed alarm in use. Pt tolerates diet. Coarse crackles noted upon auscultation of lungs. Non productive wet cough noted. Pt sat 85-88% on 1L;changed to 2 L 90-92%. Pt refused 1/2 scheduled medication stating, \"It's too late to take all that medication I'm not going to take it I need to rest.\" Pt agreed to take antibiotic and tamiflu but nothing more stating it's just too much. Will cont to monitor.

## 2019-03-25 NOTE — PROGRESS NOTES
[facility] Wound Ostomy Continence Nurse  Follow-up Progress Note       NAME:  Shirley Camacho  MEDICAL RECORD NUMBER:  [id number]  AGE:  80 y.o. GENDER:  female  :  1927  TODAY'S DATE:  3/25/2019    Subjective:   Wound Identification:  Wound Type: pressure  Contributing Factors: weak, sedentary      Patient Goal of Care:  [x] Wound Healing  [] Odor Control  [] Palliative Care  [] Pain Control   [] Other:     Objective:    BP (!) 157/86   Pulse 81   Temp 97.8 °F (36.6 °C) (Oral)   Resp 24   Ht 4' 11\" (1.499 m)   Wt 104 lb 4.4 oz (47.3 kg)   SpO2 (!) 9%   BMI 21.06 kg/m²   Zechariah Risk Score: Zechariah Scale Score: 19  Assessment:   Measurements:  Wound 19 Pelvis Anterior;Mid Wet, Odor (Active)   Wound Other 3/23/2019 11:47 AM   Dressing Status Clean;Dry 3/24/2019  7:47 PM   Dressing/Treatment Open to air 3/25/2019  8:06 AM   Wound Cleansed Not Cleansed 3/21/2019  3:53 AM   Wound Assessment Red; Intact 3/23/2019  7:40 AM   Drainage Amount None 3/24/2019  7:47 PM   Odor None 3/24/2019  7:47 PM   Anny-wound Assessment Pink; Intact 3/23/2019  7:40 AM   Number of days: 4       Wound 19 Buttocks Right STAGE 1 PRESSURE INJURY - non-blanching erythema (Active)   Wound Pressure Stage  1 3/25/2019  8:06 AM   Dressing/Treatment Open to air; Other (comment) 3/25/2019  8:06 AM   Wound Length (cm) 6 cm 3/25/2019  8:06 AM   Wound Width (cm) 2 cm 3/25/2019  8:06 AM   Wound Depth (cm) 0 cm 3/25/2019  8:06 AM   Wound Surface Area (cm^2) 12 cm^2 3/25/2019  8:06 AM   Wound Volume (cm^3) 0 cm^3 3/25/2019  8:06 AM   Wound Assessment Non-blanchable erythema 3/25/2019  8:06 AM   Drainage Amount None 3/25/2019  8:06 AM   Odor None 3/25/2019  8:06 AM   Margins Undefined edges 3/25/2019  8:06 AM   Red%Wound Bed 100 3/25/2019  8:06 AM   Number of days: 0     Response to treatment:  No c/o's    Plan:   Plan of Care: Wound 19 Buttocks Right STAGE 1 PRESSURE INJURY - non-blanching erythema-Dressing/Treatment: Open to air, Other (comment)(venelex ordered)  Wound 03/20/19 Pelvis Anterior;Mid Wet, Odor-Dressing/Treatment: Open to air    Specialty Bed Required :yes  [] Low Air Loss   [] Pressure Redistribution  [] Fluid Immersion  [] Bariatric  [] Total Pressure Relief  [x] Other: chair cushion    Patient/Caregiver Teaching:  Level of patient/caregiver understanding able to:   [x] Indicates understanding       [] Needs reinforcement  [] Unsuccessful      [x] Verbal Understanding  [] Demonstrated understanding       [] No evidence of learning  [] Refused teaching         [] N/A       Electronically signed by Amador Craft RN, Rom Romero on 3/25/2019 at 12:29 PM

## 2019-03-25 NOTE — CARE COORDINATION
Case Management Discharge Assessment  Spoke with son Vi Almanzar [phone] aware of DC, transport set up with [facility] [phone] for m1200 on stretcher with O2, pt aware of DC plan, [facility] he is ready for colt to come today aware of P/U time of noon today. HENS completed #[id number] IMM completed. 3/25/2019  [facility] Case Management Department    Patient: Ritchie Ruggiero  MRN: [id number] / : 1927  ACCT: [de-identified]          Admission Documentation  Attending Provider: Ki Weber MD  Admit date/time: 3/20/2019 10:50 AM  Status: Inpatient [101]  Diagnosis: Influenza     Readmission within last 30 days:  no     Living Situation  Discharge Planning  Living Arrangements: Alone  Support Systems: Children, Other (Comment)([facility] )  Potential Assistance Needed: Outpatient PT/OT, Extended Care Placement  Type of Home Care Services: Aide Services, Nursing Services  Patient expects to be discharged to[de-identified] Independent Living  Expected Discharge Date: 19    Service Assessment:       Values / Beliefs  Do you have any ethnic, cultural, sacramental, or spiritual Restorationist needs you would like us to be aware of while you are in the hospital?: No    Advance Directives (For Healthcare)  Pre-existing DNR Comfort Care/DNR Arrest/DNI Order: Yes, notify physician for order  Healthcare Directive: Yes, patient has an advance directive for healthcare treatment  Type of Healthcare Directive: Health care treatment directive  Copy in Chart: Yes, copy in chart  Chart Copy Status [de-identified] Active                        Pharmacy: facility to fill   Potential Assistance Purchasing Medications:  No  Does patient want to participate in local refill/meds to beds program?: Not Assessed    Notification completed in HENS/PAS? Yes     IMM  Yes       Discharge disposition: SNF:[facility] at the [facility] Phone: [phone] Fax: [phone]  Discharge Event  Discharged with Documented Belongings: Yes  Departure Mode:  In ambulance  Mobility at Departure: Stretcher  Discharged to: [address]  Time of Discharge: [facility]  Name of [address] [place] [address] at the [facility]  Phone of [facility]  Fax of [phone]    Mode of Transport stretcher ambualnce  Name of Transport first care  Number of Transport [phone]  Time of Transport [address] and her family were provided with choice of provider; she and her family are in agreement with the discharge plan.       Care Transitions patient: Eduarda Dumas RN  [facility]  Case Management Department  Ph: [phone] Fax: [phone]

## 2019-03-25 NOTE — DISCHARGE SUMMARY
reclined in bed, appears stated age and cooperative. Neck: Supple, with full range of motion. Respiratory:  Normal respiratory effort. Clear to auscultation, bilaterally without rales/wheezes/rhonchi. Cardiovascular: Regular rate and rhythm with normal S1/S2 without murmurs, rubs or gallops. Abdomen: Soft, non-tender, non-distended with normal bowel sounds. Multiple large ventral hernia with superficial panus wound, covered. Musculoskeletal: No clubbing, cyanosis or edema bilaterally. Full range of motion without deformity. Neurologic:  Alert and oriented x4 (self, place, situation, time). Neurovascularly intact without any focal sensory/motor deficits. Cranial nerves: II-XII intact, grossly non-focal.  Capillary Refill: Brisk,< 3 seconds   Peripheral Pulses: +2 palpable, equal bilaterally     Consults: none  Significant Diagnostic Studies:  none  Treatments: augmentin and Tamiflu  Disposition: long term care facility  Discharged Condition: Stable  Follow Up: Primary Care Physician in one week    DISCHARGE MEDICATION:     Medication List      START taking these medications    amoxicillin-clavulanate 875-125 MG per tablet  Commonly known as:  AUGMENTIN  Take 1 tablet by mouth every 12 hours for 8 doses     metoprolol succinate 25 MG extended release tablet  Commonly known as:  TOPROL XL  Take 1 tablet by mouth daily  Start taking on:  3/26/2019     nystatin 426860 UNIT/GM cream  Commonly known as:  MYCOSTATIN  Apply topically 2 times daily.         CONTINUE taking these medications    acetaminophen 500 MG tablet  Commonly known as:  TYLENOL  Take 1 tablet by mouth 4 times daily as needed for Pain     albuterol (2.5 MG/3ML) 0.083% nebulizer solution  Commonly known as:  PROVENTIL  Take 3 mLs by nebulization every 6 hours as needed for Wheezing     budesonide 0.5 MG/2ML nebulizer suspension  Commonly known as:  PULMICORT  Take 2 mLs by nebulization 2 times daily     escitalopram 5 MG tablet  Commonly known as: LEXAPRO  Take 1 tablet by mouth nightly     hydrocortisone 2.5 % cream  Apply topically 2 times daily.      metroNIDAZOLE 0.75 % cream  Commonly known as:  METROCREAM  Apply to face BID     MUCINEX 600 MG extended release tablet  Generic drug:  guaiFENesin           Where to Get Your Medications      You can get these medications from any pharmacy    Bring a paper prescription for each of these medications  · amoxicillin-clavulanate 875-125 MG per tablet  · metoprolol succinate 25 MG extended release tablet  · nystatin 829934 UNIT/GM cream       Activity: activity as tolerated  Diet: regular diet  Wound Care: none needed    Time Spent on discharge is more than 30 minutes    Signed:  Beto Lim MD   PGY1  3/25/2019

## 2019-03-25 NOTE — DISCHARGE INSTR - COC
Continuity of Care Form    Patient Name: Ritchie Ruggiero   :  1927  MRN:  [id number]    Admit date:  3/20/2019  Discharge date:  ***    Code Status Order: DNR-CCA   Advance Directives:   Advance Care Flowsheet Documentation     Date/Time Healthcare Directive Type of Healthcare Directive Copy in [address] Agent's Name Healthcare Agent's Phone Number    [phone]  Yes, patient has an advance directive for healthcare treatment  Health care treatment directive  Yes, copy in chart  --  --  --          Admitting Physician:  Ki Weber MD  PCP: Ki Weber MD    Discharging Nurse: [facility] Unit/Room#: 7789/0995-31  Discharging Unit Phone Number: [phone]    Emergency Contact:   Extended Emergency Contact Information  Primary Emergency Contact: Murali Loja [address] Phone: [phone]  Relation: Child    Past Surgical History:  Past Surgical History:   Procedure Laterality Date    APPENDECTOMY      LUNG REMOVAL, PARTIAL         Immunization History:   Immunization History   Administered Date(s) Administered    Influenza Vaccine, unspecified formulation 10/19/2017    Influenza, High Dose (Fluzone 65 yrs and older) 10/07/2010, 2012, 2014, 2015, 2016, 2017, 10/06/2018, 10/20/2018    Pneumococcal 13-valent Conjugate ([id number]) 2014    Pneumococcal Polysaccharide ([id number]) 2006    Td, unspecified formulation 2003, 08/10/2008    Tdap (Boostrix, Adacel) 2013       Active Problems:  Patient Active Problem List   Diagnosis Code    Other emphysema ([facility] J43.8    Other osteoporosis without current pathological fracture M81.8    Recurrent falls while walking R29.6    H/O: lung cancer Z85. 80    Bronchiectasis with acute exacerbation ([facility] [facility] J47.1    Hypertensive urgency I16.0    Influenza J11.1    Altered mental status R41.82    SVT (supraventricular tachycardia) ([facility] [address] [facility] Catheter: None   Colostomy/Ileostomy/Ileal Conduit: No       Date of Last BM: ***  No intake or output data in the 24 hours ending 03/25/19 1050  I/O last 3 completed shifts: In: 200 [P.O.:200]  Out: -     Safety Concerns: At Risk for Falls    Impairments/Disabilities:      None    Nutrition Therapy:  Current Nutrition Therapy:   - Oral Diet:  General    Routes of Feeding: Oral  Liquids: No Restrictions  Daily Fluid Restriction: no  Last Modified Barium Swallow with Video (Video Swallowing Test): not done    Treatments at the Time of Hospital Discharge:   Respiratory Treatments: ***  Oxygen Therapy:  {Therapy; copd oxygen:83209}  Ventilator:    - No ventilator support    Rehab Therapies: Physical Therapy and Occupational Therapy  Weight Bearing Status/Restrictions: No weight bearing restirctions  Other Medical Equipment (for information only, NOT a DME order):  walker  Other Treatments: ***    Patient's personal belongings (please select all that are sent with patient):  Glasses, Hearing Aides bilateral    RN SIGNATURE:  Electronically signed by Sadie Schaefer RN on 3/25/19 at 11:01 AM    CASE MANAGEMENT/SOCIAL WORK SECTION    Inpatient Status Date: ***    Readmission Risk Assessment Score:  Readmission Risk              Risk of Unplanned Readmission:        11           Discharging to Facility/ Agency   · Name: Quique Greene at the [place] [address]        Phone: [phone]       Fax: [phone]        ·     / signature: Electronically signed by Queen Luis RN on 3/25/19 at 10:51 AM    PHYSICIAN SECTION    Prognosis: Fair    Condition at Discharge: Stable    Rehab Potential (if transferring to Rehab):  Fair    Recommended Labs or Other Treatments After Discharge: PT OT eval and treat, Oxygen 2L continuous    Physician Certification: I certify the above information and transfer of Geeta Toi  is necessary for the continuing treatment of the diagnosis listed and that she requires [place] for less than 30 days.      Update Admission H&P: No change in H&P    PHYSICIAN SIGNATURE:  Electronically signed by Clare Robins MD on 3/25/19 at 11:39 AM

## 2019-03-29 NOTE — ED PROVIDER NOTES
[facility] ENCOUNTER          PHYSICIAN ASSISTANT NOTE       Date of evaluation: 3/20/2019    Chief Complaint     Altered Mental Status      History of Present Illness     Byron Aschoff is a 80 y.o. female with a past medical history of emphysema and partial right pneumonectomy who presents for altered mental status. Patient arrives to the ER from independent living via EMS. Son is present with patient. Per son, the patient has been declining over the past week. Patient did fall once last week, unknown if she hit her head or loss consciousness. Since Sunday, the patient has seemed more confused than usual.  Son reports the nurse's aid reports the patient has been running into walls with her walker and \"speaking gibberish\" at times. Last known normal was at least Sunday, today is Wednesday. Patient also with apparent shortness of breath today, son reports this is increased from her baseline. Patient is not on oxygen at baseline. Son also reports the nurses aides were concerned for UTI due to an odor. Patient denies any complaints, history limited from patient. Review of Systems     Review of Systems   Unable to perform ROS: Dementia       Past Medical, Surgical, Family, and Social History     She has a past medical history of Basal cell carcinoma, Cancer ([address] Localized edema, and Other emphysema ([address] She has a past surgical history that includes Appendectomy and Lung removal, partial.  Her family history is not on file. She reports that she quit smoking about 29 years ago. Her smoking use included cigarettes. She quit after 10.00 years of use. She has never used smokeless tobacco. She reports that she drinks about 0.6 oz of alcohol per week. She reports that she does not use drugs.     Medications     Discharge Medication List as of 3/25/2019 11:44 AM      CONTINUE these medications which have NOT CHANGED    Details   escitalopram (LEXAPRO) 5 MG tablet Take 1 branch block (complete)  ST Segments: no acute change  T Waves: no acute change  Q Waves:nonspecific  Clinical Impression: non-specific EKG  Comparison:  Similar to 2/26/19    RADIOLOGY:  CT Head WO Contrast   Final Result      No acute hemorrhage      XR CHEST STANDARD (2 VW)   Final Result      Possible airspace disease in the left lung base. The exam is limited due to patient positioning      Demineralization and moderate to severe dextrocurvature of the thoracic spine                LABS:   Results for orders placed or performed during the hospital encounter of 03/20/19   Urine Culture   Result Value Ref Range    Urine Culture, Routine No growth at 18-36 hours    Culture blood #1   Result Value Ref Range    Blood Culture, Routine No growth after 5 days of incubation. Culture blood #2   Result Value Ref Range    Culture, Blood 2 No growth after 5 days of incubation. Rapid influenza A/B antigens   Result Value Ref Range    Rapid Influenza A Ag POSITIVE (A) Negative    Rapid Influenza B Ag Negative Negative   Legionella antigen, urine   Result Value Ref Range    L. pneumophila Serogp 1 Ur Ag       Presumptive Negative  No Legionella pneumophilia serogroup 1 antigens detected. A negative result does not exclude infection with  Legionella pneumophila serogroup 1 nor does it rule out  other microbial-caused respiratory infections or  disease caused by other serogroups of  Legionella pneumophila. Normal Range: Presumptive Negative     Strep Pneumoniae Antigen   Result Value Ref Range    STREP PNEUMONIAE ANTIGEN, URINE (A)      POSITIVE for pneumococcal pneumonia  Normal Range: Presumptive Negative   ? Internal Positive Control:  OK     Respiratory Panel, Film Array   Result Value Ref Range    Respiratory Panel PCR      Organism Influenza A H3 detected by PCR (A)     Respiratory Panel PCR       POSITIVE FOR  See additional report for complete Respiratory Pathogen Panel     Respiratory Panel Film Array Report Epi Cells 0-2 /HPF    Bacteria, UA 2+ (A) /HPF    Amorphous, UA 3+ (A) /HPF   Comprehensive Metabolic Panel w/ Reflex to MG   Result Value Ref Range    Sodium 127 (L) 136 - 145 mmol/L    Potassium reflex Magnesium 3.8 3.5 - 5.1 mmol/L    Chloride 90 (L) 99 - 110 mmol/L    CO2 26 21 - 32 mmol/L    Anion Gap 11 3 - 16    Glucose 225 (H) 70 - 99 mg/dL    BUN 20 7 - 20 mg/dL    CREATININE <0.5 (L) 0.6 - 1.2 mg/dL    GFR Non-African American >60 >60    GFR African American >60 >60    Calcium 8.6 8.3 - 10.6 mg/dL    Total Protein 5.9 (L) 6.4 - 8.2 g/dL    Alb 3.6 3.4 - 5.0 g/dL    Albumin/Globulin Ratio 1.6 1.1 - 2.2    Total Bilirubin 0.4 0.0 - 1.0 mg/dL    Alkaline Phosphatase 70 40 - 129 U/L    ALT 13 10 - 40 U/L    AST 23 15 - 37 U/L    Globulin 2.3 g/dL   Urine Reflex to Culture   Result Value Ref Range    Color, UA Yellow Straw/Yellow    Clarity, UA Clear Clear    Glucose, Ur Negative Negative mg/dL    Bilirubin Urine Negative Negative    Ketones, Urine Negative Negative mg/dL    Specific Gravity, UA >=1.030 1.005 - 1.030    Blood, Urine Negative Negative    pH, UA 6.0 5.0 - 8.0    Protein,  (A) Negative mg/dL    Urobilinogen, Urine 0.2 <2.0 E.U./dL    Nitrite, Urine Negative Negative    Leukocyte Esterase, Urine Negative Negative    Microscopic Examination YES     Urine Reflex to Culture Not Indicated     Urine Type Not Specified    Basic Metabolic Panel w/ Reflex to MG   Result Value Ref Range    Sodium 129 (L) 136 - 145 mmol/L    Potassium reflex Magnesium 3.8 3.5 - 5.1 mmol/L    Chloride 90 (L) 99 - 110 mmol/L    CO2 29 21 - 32 mmol/L    Anion Gap 10 3 - 16    Glucose 124 (H) 70 - 99 mg/dL    BUN 24 (H) 7 - 20 mg/dL    CREATININE <0.5 (L) 0.6 - 1.2 mg/dL    GFR Non-African American >60 >60    GFR African American >60 >60    Calcium 8.9 8.3 - 10.6 mg/dL   CBC auto differential   Result Value Ref Range    WBC 12.8 (H) 4.0 - 11.0 K/uL    RBC 4.04 4.00 - 5.20 M/uL    Hemoglobin 12.3 12.0 - 16.0 g/dL Hematocrit 36.3 36.0 - 48.0 %    MCV 89.9 80.0 - 100.0 fL    MCH 30.5 26.0 - 34.0 pg    MCHC 33.9 31.0 - 36.0 g/dL    RDW 14.7 12.4 - 15.4 %    Platelets 250 097 - 173 K/uL    MPV 8.7 5.0 - 10.5 fL    Neutrophils % 88.8 %    Lymphocytes % 3.6 %    Monocytes % 7.5 %    Eosinophils % 0.0 %    Basophils % 0.1 %    Neutrophils # 11.4 (H) 1.7 - 7.7 K/uL    Lymphocytes # 0.5 (L) 1.0 - 5.1 K/uL    Monocytes # 1.0 0.0 - 1.3 K/uL    Eosinophils # 0.0 0.0 - 0.6 K/uL    Basophils # 0.0 0.0 - 0.2 K/uL   Microscopic Urinalysis   Result Value Ref Range    WBC, UA 3-5 0 - 5 /HPF    RBC, UA 0-2 0 - 2 /HPF    Epi Cells 3-5 /HPF    Renal Epithelial, Urine 3-5 (A) /HPF    Bacteria, UA 3+ (A) /HPF   Magnesium   Result Value Ref Range    Magnesium 1.90 1.80 - 2.40 mg/dL   CBC auto differential   Result Value Ref Range    WBC 14.6 (H) 4.0 - 11.0 K/uL    RBC 4.11 4.00 - 5.20 M/uL    Hemoglobin 12.6 12.0 - 16.0 g/dL    Hematocrit 37.5 36.0 - 48.0 %    MCV 91.3 80.0 - 100.0 fL    MCH 30.6 26.0 - 34.0 pg    MCHC 33.5 31.0 - 36.0 g/dL    RDW 14.9 12.4 - 15.4 %    Platelets 519 713 - 985 K/uL    MPV 8.7 5.0 - 10.5 fL    Neutrophils % 89.6 %    Lymphocytes % 4.3 %    Monocytes % 5.9 %    Eosinophils % 0.0 %    Basophils % 0.2 %    Neutrophils # 13.1 (H) 1.7 - 7.7 K/uL    Lymphocytes # 0.6 (L) 1.0 - 5.1 K/uL    Monocytes # 0.9 0.0 - 1.3 K/uL    Eosinophils # 0.0 0.0 - 0.6 K/uL    Basophils # 0.0 0.0 - 0.2 K/uL   Renal function panel   Result Value Ref Range    Sodium 129 (L) 136 - 145 mmol/L    Potassium 4.1 3.5 - 5.1 mmol/L    Chloride 90 (L) 99 - 110 mmol/L    CO2 30 21 - 32 mmol/L    Anion Gap 9 3 - 16    Glucose 120 (H) 70 - 99 mg/dL    BUN 20 7 - 20 mg/dL    CREATININE <0.5 (L) 0.6 - 1.2 mg/dL    GFR Non-African American >60 >60    GFR African American >60 >60    Calcium 8.7 8.3 - 10.6 mg/dL    Phosphorus 2.3 (L) 2.5 - 4.9 mg/dL    Alb 3.6 3.4 - 5.0 g/dL   CBC auto differential   Result Value Ref Range    WBC 9.2 4.0 - 11.0 K/uL    RBC 3.91 (MYCOSTATIN) 644591 UNIT/GM cream     Sig: Apply topically 2 times daily. Dispense:  1 Tube     Refill:  0    amoxicillin-clavulanate (AUGMENTIN) 875-125 MG per tablet     Sig: Take 1 tablet by mouth every 12 hours for 8 doses     Dispense:  8 tablet     Refill:  0    DISCONTD: VENELEX ointment       CONSULTS:  IP CONSULT TO PRIMARY CARE PROVIDER    MEDICAL DECISION MAKING / ASSESSMENT / Marc Linn MELVINA Ponce is a 80 y.o. female with altered mental status. Patient is alert on arrival with a non-focal neurologic exam. EKG is a-flutter, no changes compared to prior. Patient requiring supplemental oxygen by nasal cannula. Breath sounds diminished. Duoneb and prednisone ordered. Fluids initiated. Blood cultures x 2. VBG with pH of 7.33 and CO2 of 59. Lactate normal. Influenza A is positive, pharmacy recommends lower Tamiflu dose based on age - ordered. Chest x-ray without pneumonia. Ct head without acute process. urinalysis without infection. Troponin negative. Patient will be admitted for altered mental status, influenza, hypoxia. Patient accepted by Dr. Remy Mccarthy and AOPANCHITO. This patient was also evaluated by the attending physician. All care plans were discussed and agreed upon. Clinical Impression     1. Altered mental status, unspecified altered mental status type    2. Flu    3. Hypoxia        Disposition     PATIENT REFERRED TO:  Buffy Frost MD  [facility]            DISCHARGE MEDICATIONS:  Discharge Medication List as of 3/25/2019 11:44 AM      START taking these medications    Details   metoprolol succinate (TOPROL XL) 25 MG extended release tablet Take 1 tablet by mouth daily, Disp-30 tablet, R-3Print      nystatin (MYCOSTATIN) [id number] UNIT/GM cream Apply topically 2 times daily. , Disp-1 Tube, R-0, Print      amoxicillin-clavulanate (AUGMENTIN) 875-125 MG per tablet Take 1 tablet by mouth every 12 hours for 8 doses, Disp-8 tablet, R-0Print DISPOSITION  admit     Ruel Lanes, PA-C  03/20/19 [address] [facility] Garrett Griffith PA-C  03/28/19 2001

## 2019-04-09 NOTE — TELEPHONE ENCOUNTER
Sujatha Pedraza @ [facility] [place] called and needs to scheduled and appointment for patient, discharging on 04/16/19.       Please call and advise

## 2019-04-10 NOTE — TELEPHONE ENCOUNTER
Call returned to [facility] at extension [phone]. Message left for her to call back to schedule appointment for patient.

## 2019-04-11 NOTE — TELEPHONE ENCOUNTER
Pt is expected to be discharged from rehab 4/16. A transitional appt has been set for  4/24. Please contact to go over Transitional Appointment Questionnaire. Thank you.

## 2019-04-22 NOTE — TELEPHONE ENCOUNTER
Spoke with the patient son and he stated that the patient is now going into another facility and will not need the appointment with Dr Patience Miller at this time. He will call when an appointment is needed.  Appointment on 5/15/2019 cancelled

## 2019-05-03 NOTE — TELEPHONE ENCOUNTER
Son (on HIPAA) states that patient is currently residing at nursing facility [facility]) [address] [facility]?) and is checking on budesonide nebulizer dosage. Discharge summary from recent hosp stay had med list, so this was relayed. Son satisfied. He will contact Dr. Martha Woods and physician at facility, or Dr. Jorge Bland, if he needs further clarifications on meds.

## 2019-06-19 NOTE — TELEPHONE ENCOUNTER
Tung Barrera called in wanting to re certify the pt for    Speech  Occupational Therapy  Physical Therapy      She wanted to know if there is anything else the Dr wants to discuss about this pt

## 2019-07-11 PROBLEM — J18.9 COMMUNITY ACQUIRED PNEUMONIA OF RIGHT LOWER LOBE OF LUNG: Status: ACTIVE | Noted: 2019-01-01

## 2019-07-11 PROBLEM — J18.9 PNEUMONIA: Status: ACTIVE | Noted: 2019-01-01

## 2019-07-11 NOTE — ED NOTES
Bed:  2[facility]  Expected date:   Expected time:   Means of arrival:   Comments:  audie Anthony RN  07/11/19 9814

## 2019-07-11 NOTE — ED PROVIDER NOTES
She has normal reflexes. Skin: Skin is warm and dry. No rash noted. No erythema. Psychiatric: She has a normal mood and affect. Her behavior is normal. Thought content normal.       DiagnosticResults     EKG   Interpreted in conjunction with emergencydepartment physician Anson Zazueta MD  Rhythm: normal sinus   Rate: normal  Axis: normal  Ectopy: none  Conduction: normal  ST Segments: Subtle depressions in lateral leads  T Waves: Biphasic in lead III  Q Waves: none  Clinical Impression: Sinus rhythm without STEMI    RADIOLOGY:  XR CHEST PORTABLE   Final Result   1.  Multifocal airspace disease, multifocal pneumonia cardiomegaly          LABS:   Results for orders placed or performed during the hospital encounter of 07/11/19   CBC Auto Differential   Result Value Ref Range    WBC 22.3 (H) 4.0 - 11.0 K/uL    RBC 3.94 (L) 4.00 - 5.20 M/uL    Hemoglobin 12.0 12.0 - 16.0 g/dL    Hematocrit 36.0 36.0 - 48.0 %    MCV 91.5 80.0 - 100.0 fL    MCH 30.4 26.0 - 34.0 pg    MCHC 33.3 31.0 - 36.0 g/dL    RDW 13.8 12.4 - 15.4 %    Platelets 037 (H) 283 - 450 K/uL    MPV 7.8 5.0 - 10.5 fL    Neutrophils % 81.0 %    Lymphocytes % 5.0 %    Monocytes % 8.0 %    Eosinophils % 0.0 %    Basophils % 0.0 %    Neutrophils # 19.4 (H) 1.7 - 7.7 K/uL    Lymphocytes # 1.1 1.0 - 5.1 K/uL    Monocytes # 1.8 (H) 0.0 - 1.3 K/uL    Eosinophils # 0.0 0.0 - 0.6 K/uL    Basophils # 0.0 0.0 - 0.2 K/uL    Bands Relative 6 0 - 7 %    Schistocytes Occasional (A)     Ovalocytes 1+ (A)    Basic Metabolic Panel w/ Reflex to MG   Result Value Ref Range    Sodium 135 (L) 136 - 145 mmol/L    Potassium reflex Magnesium 4.6 3.5 - 5.1 mmol/L    Chloride 92 (L) 99 - 110 mmol/L    CO2 34 (H) 21 - 32 mmol/L    Anion Gap 9 3 - 16    Glucose 147 (H) 70 - 99 mg/dL    BUN 28 (H) 7 - 20 mg/dL    CREATININE <0.5 (L) 0.6 - 1.2 mg/dL    GFR Non-African American >60 >60    GFR African American >60 >60    Calcium 9.5 8.3 - 10.6 mg/dL   Brain Natriuretic Peptide   Result was also evaluated by the attending physician. All care plans werediscussed and agreed upon. Clinical Impression     1. Pneumonia due to infectious organism, unspecified laterality, unspecified part of lung        Disposition     PATIENT REFERRED TO:  No follow-up provider specified.     DISCHARGE MEDICATIONS:  New Prescriptions    No medications on file       DISPOSITION Decision To Admit 07/11/2019 04:37:12 PM       Ximena Howell MD  Resident  07/11/19 6944

## 2019-07-11 NOTE — H&P
TRICHOMONAS, YEAST, BACTERIA, CLARITYU, SPECGRAV, LEUKOCYTESUR, UROBILINOGEN, BILIRUBINUR, BLOODU, GLUCOSEU, AMORPHOUS in the last 72 hours. Invalid input(s): KETONESU    XR CHEST PORTABLE   Final Result   1. Multifocal airspace disease, multifocal pneumonia cardiomegaly              ASSESSMENT AND PLAN:    Wayne Issa is a 80 y.o. female with PMH Of lung cancer s/p lobectomy, HTN, COPD, dementia who presents to ED from assissted living after her pulse oximetry reading for evaluation was found to sat between 70-80%    #Acute Hypoxic Hypercarbic Respiratory Failure 2/2 Pneumonia vs COPD exacerbation. -WBC 22. Increased O2 requirement 2L->4L. Chest x-ray: Right middle opacity. Previous history of Lung cancer & COPD. VBG 7.321/71.1/47/36.7.  -Start on Ceftriaxone, Azithromycin. -F/U ABG, Urine strep & Legionella, MRSA nare swab  -Given 125 mg dose of salumedrol  mg in the ED. Started on 10 mg Prednisone. #HTN  Continue home metoprolol. #Dementia  Continue home citalopram.       Will discuss with attending physician Dr. Nelsy Lopez Status:Full code  FEN: General pending swallow eval  PPX: Lovenox.    DISPO: Anam Jason MD  7/11/2019,  5:28 PM

## 2019-07-12 PROBLEM — J96.21 ACUTE ON CHRONIC RESPIRATORY FAILURE WITH HYPOXIA AND HYPERCAPNIA (HCC): Status: ACTIVE | Noted: 2019-01-01

## 2019-07-12 PROBLEM — J96.22 ACUTE ON CHRONIC RESPIRATORY FAILURE WITH HYPOXIA AND HYPERCAPNIA (HCC): Status: ACTIVE | Noted: 2019-01-01

## 2019-07-16 LAB
BLOOD CULTURE, ROUTINE: NORMAL
CULTURE, BLOOD 2: NORMAL
